# Patient Record
Sex: FEMALE | Race: WHITE | NOT HISPANIC OR LATINO | Employment: OTHER | URBAN - METROPOLITAN AREA
[De-identification: names, ages, dates, MRNs, and addresses within clinical notes are randomized per-mention and may not be internally consistent; named-entity substitution may affect disease eponyms.]

---

## 2022-02-07 ENCOUNTER — TELEPHONE (OUTPATIENT)
Dept: HEMATOLOGY ONCOLOGY | Facility: CLINIC | Age: 72
End: 2022-02-07

## 2022-02-07 NOTE — TELEPHONE ENCOUNTER
Soft Intake Form   Patient Details   Fatoumata Kendall     1950     [id number]     Reason For Appointment   Who is Calling? Patient   If not patient, Name? Who is the Referring Doctor? Dr Dominick Perry    What is the diagnosis? 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 If yes, what is the date it was done? Yes  01/28/22   Biopsy done at [place] [address]? If not, where was it done? no    [facility]    Was imaging done, and was it done at [facility]? If not, where was it done? Yes        Have you been seen by another Oncologist?  If so, who and where (name of facility, city and state) No    For 2nd Opinions Only: Are you currently undergoing treatment, or are you scheduled to start treatment? If yes, name of facility, city and state  n/a   For "History Of" only: Have you completed treatment?  n/a   Have you had Genetic Testing done in the past?  If so, advise to bring test results to their visit no   Record Gathering Information   Did you advise to have records faxed to [phone]? Yes    Did you advise to have disks sent to the proper address with imaging? ("History of" Patients)  5 years of imaging for breast patients-[place]ammos, [place] etc No    Scheduling Information   What is the best call back number?    (If the [facility] is calling, please use their number) [phone]   Miscellaneous Information   I have called Dr Kit Doherty office and have spoken to [facility] from [place]on requesting for patient's records to please be faxed over to [phone]    Patient wanted to be scheduled only with Dr Ann Dye but his appointments were very far out and she does not want to travel any further than Mary mayo made for 03/08

## 2022-02-07 NOTE — TELEPHONE ENCOUNTER
Intake received  Insurance education outreach to follow    02/23/22  Pt has an active [facility] plan that  Has an active date of 01/01/22  No deduct  Out of pocket $4900 met $63 87    03/17/22  Called pt top go over he benefits got her voicemail   Left her a message to call me back    2ND ATTEMPT  05/02/22  Per RTE pt has met $113 87 of the $4900 out of pocket  Called pt to go over the benefits got her voicemail left her a message to call me back

## 2022-02-22 ENCOUNTER — TELEPHONE (OUTPATIENT)
Dept: HEMATOLOGY ONCOLOGY | Facility: CLINIC | Age: 72
End: 2022-02-22

## 2022-02-22 NOTE — TELEPHONE ENCOUNTER
Appointment Confirmation (to confirm pre existing appointments - ONLY)     Appointment with  Dr Tammy Carney   Appointment date & time  2/23/22 at 11:30   Location [facility]   Patient verbilized Understanding  Yes

## 2022-02-23 ENCOUNTER — CONSULT (OUTPATIENT)
Dept: GYNECOLOGIC ONCOLOGY | Facility: CLINIC | Age: 72
End: 2022-02-23
Payer: COMMERCIAL

## 2022-02-23 VITALS
WEIGHT: 214 LBS | HEIGHT: 65 IN | TEMPERATURE: 96.1 F | RESPIRATION RATE: 18 BRPM | DIASTOLIC BLOOD PRESSURE: 60 MMHG | HEART RATE: 79 BPM | BODY MASS INDEX: 35.65 KG/M2 | OXYGEN SATURATION: 97 % | SYSTOLIC BLOOD PRESSURE: 118 MMHG

## 2022-02-23 DIAGNOSIS — C51.9 VULVAR CANCER (HCC): Primary | ICD-10-CM

## 2022-02-23 PROCEDURE — 99245 OFF/OP CONSLTJ NEW/EST HI 55: CPT | Performed by: OBSTETRICS & GYNECOLOGY

## 2022-02-23 RX ORDER — MELOXICAM 15 MG/1
15 TABLET ORAL AS NEEDED
COMMUNITY
Start: 2021-10-15

## 2022-02-23 RX ORDER — HYDROCHLOROTHIAZIDE 12.5 MG/1
1 CAPSULE, GELATIN COATED ORAL DAILY
COMMUNITY
Start: 2022-01-25

## 2022-02-23 RX ORDER — HEPARIN SODIUM 5000 [USP'U]/ML
5000 INJECTION, SOLUTION INTRAVENOUS; SUBCUTANEOUS
Status: CANCELLED | OUTPATIENT
Start: 2022-02-24 | End: 2022-02-25

## 2022-02-23 RX ORDER — LISINOPRIL 40 MG/1
20 TABLET ORAL DAILY
COMMUNITY
Start: 2022-01-25

## 2022-02-23 RX ORDER — ASPIRIN 325 MG
325 TABLET ORAL DAILY
COMMUNITY
End: 2022-03-23 | Stop reason: HOSPADM

## 2022-02-23 RX ORDER — ESTRADIOL 0.1 MG/G
CREAM VAGINAL
COMMUNITY
Start: 2021-11-02

## 2022-02-23 NOTE — H&P (VIEW-ONLY)
Assessment/Plan:    Problem List Items Addressed This Visit        Genitourinary    Vulvar cancer [facility]) - Primary     Patient is very pleasant 51-year-old female with a palpable lesion for several months to a year which has progressed over that time  Recent biopsy indicates 3 sites consistent with squamous cell carcinoma  The clinical exam is consistent with squamous cell carcinoma involving the josé miguel clitoral area all the way down to the 6:00 a m  Region in the perineum  The lesion itself is most consistent with clinical stage II disease of there may be some involvement of the distal vagina  The lesion appears to be resectable  We discussed risks and benefits including bleeding requiring transfusion infection and damage to local structures as well as cosmetic change  Patient understands accepts the risks of surgery have recommended radical vulvectomy with bilateral inguinal femoral lymph node dissection  Consent form was obtained  We discussed the patient will need a Mesa catheter for some time as well as bilateral groin drains  We discussed that an epidural may be helpful postoperatively  Preoperatively a CT of the abdomen and pelvis will be performed to rule out metastatic disease  Chest x-ray will also be performed  Routine preop blood work chest x-ray an EKG will be ordered  Relevant Orders    CT abdomen pelvis w contrast    Case request operating room: [facility] ([facility])    Type and screen    Basic metabolic panel    CBC and differential    EKG 12 lead    XR chest pa & lateral              CHIEF COMPLAINT:  Newly diagnosed squamous cell carcinoma of the vulva        Previous therapy:  Oncology History   Vulvar cancer ([facility] [address] )   2/23/2022 Initial Diagnosis    Vulvar cancer ([facility] [address] )     2/23/2022 -  Cancer Staged    Staging form: Vulva, AJCC 8th Edition  - Clinical stage from 2/23/2022:  FIGO Stage II (cT2, cN0, cM0) - Signed by Nabor Babcock MD on 2/23/2022  Stage prefix: Initial diagnosis             Patient ID: Nhan Diaz is a 70 y o  female  Patient is very pleasant 79-year-old female seen in consultation from Aria Tesfaye md of the [place] [address] regarding evaluation and management of newly diagnosed squamous cell carcinoma of the vulva  Patient noticed onset of vulvar discomfort and "bumps" over the past several months to a year  She was prescribed some Estrace cream by her family physician which she used with minimal improvement  The patient was then referred to gyn and evaluation was performed including 3 vulvar biopsies  Patient was subsequently seen in [place] [address] and underwent vulvar biopsies at 6 3 and 12:00 p m  All consistent with invasive squamous cell carcinoma  Today, the patient is doing well  She denies significant abdominal pain, pelvic pain, nausea, vomiting, constipation, diarrhea, fevers, chills, or vaginal bleeding  She has noted vaginal pain and bumps more so on the left side of the introitus      Review of Systems   Constitutional: Negative  HENT: Negative  Eyes: Negative  Respiratory: Negative  Cardiovascular: Negative  Gastrointestinal: Negative  Endocrine: Negative  Genitourinary: Positive for vaginal pain  Musculoskeletal: Negative  Skin: Negative  Neurological: Negative  Hematological: Negative  Psychiatric/Behavioral: Negative  Current Outpatient Medications   Medication Sig Dispense Refill    ascorbic acid (VITAMIN C) 1000 MG tablet Take 4 tablets by mouth daily      aspirin 325 mg tablet Take 325 mg by mouth daily      estradiol (ESTRACE) 0 1 mg/g vaginal cream Apply 0 5 gm cream vaginally every night at bedtime for two weeks  Then apply 0 5 gm cream vaginally at bedtime on Mondays and Thursdays        hydrochlorothiazide (MICROZIDE) 12 5 mg capsule Take 1 capsule by mouth daily      lisinopril (ZESTRIL) 40 mg tablet Take 1 tablet by mouth daily      meloxicam (MOBIC) 15 mg tablet Take 15 mg by mouth daily      Potassium 99 MG TABS Take 99 mg by mouth daily       No current facility-administered medications for this visit  Allergies   Allergen Reactions    Bee Venom Angioedema    Honey - Food Allergy Other (See Comments)    Propranolol Other (See Comments)    Timolol Other (See Comments)    Amoxicillin Rash     ? - has taken PCN since, with no trouble       Past Medical History:   Diagnosis Date    Hypertension        Past Surgical History:   Procedure Laterality Date    CHOLECYSTECTOMY         OB History        4    Para   3    Term   3       0    AB   1    Living   3       SAB   1    IAB   0    Ectopic   0    Multiple   0    Live Births   3                 Family History   Problem Relation Age of Onset    Cancer Sister        The following portions of the patient's history were reviewed and updated as appropriate: allergies, current medications, past medical history, past social history, past surgical history and problem list       Objective:    Blood pressure 118/60, pulse 79, temperature (!) 96 1 °F (35 6 °C), resp  rate 18, height 5' 5" (1 651 m), weight 97 1 kg (214 lb), SpO2 97 %  Body mass index is 35 61 kg/m²  Physical Exam  Constitutional:       Appearance: She is well-developed  HENT:      Head: Normocephalic and atraumatic  Eyes:      Pupils: Pupils are equal, round, and reactive to light  Cardiovascular:      Rate and Rhythm: Normal rate and regular rhythm  Heart sounds: Normal heart sounds  Pulmonary:      Effort: Pulmonary effort is normal  No respiratory distress  Breath sounds: Normal breath sounds  Chest:   Breasts:      Right: No supraclavicular adenopathy  Left: No supraclavicular adenopathy  Abdominal:      General: Bowel sounds are normal  There is no distension  Palpations: Abdomen is soft  Abdomen is not rigid  Tenderness: There is no abdominal tenderness   There is no guarding or rebound  Genitourinary:         Comments: External female genitalia with lesion approximately 5 cm x 1 cm extending from the introitus all the way up the left side to the clitoral area                   -Normal midline urethral meatus  No lesions notes                  -Bladder without fullness mass or tenderness                  -Vagina without lesion or discharge No significant cystocele or rectocele noted                  -Cervix normal appearing without visible lesions                  -Uterus with normal contour, mobility  No tenderness,                  -Adnexae without  mass or tenderness                  - Anus without fissure of lesion  No inguinal lesions are noted  Musculoskeletal:         General: Normal range of motion  Cervical back: Normal range of motion and neck supple  Lymphadenopathy:      Cervical: No cervical adenopathy  Upper Body:      Right upper body: No supraclavicular adenopathy  Left upper body: No supraclavicular adenopathy  Skin:     General: Skin is warm and dry  Neurological:      Mental Status: She is alert and oriented to person, place, and time     Psychiatric:         Behavior: Behavior normal

## 2022-02-23 NOTE — PROGRESS NOTES
Assessment/Plan:    Problem List Items Addressed This Visit        Genitourinary    Vulvar cancer [facility]) - Primary     Patient is very pleasant 70-year-old female with a palpable lesion for several months to a year which has progressed over that time  Recent biopsy indicates 3 sites consistent with squamous cell carcinoma  The clinical exam is consistent with squamous cell carcinoma involving the josé miguel clitoral area all the way down to the 6:00 a m  Region in the perineum  The lesion itself is most consistent with clinical stage II disease of there may be some involvement of the distal vagina  The lesion appears to be resectable  We discussed risks and benefits including bleeding requiring transfusion infection and damage to local structures as well as cosmetic change  Patient understands accepts the risks of surgery have recommended radical vulvectomy with bilateral inguinal femoral lymph node dissection  Consent form was obtained  We discussed the patient will need a Mesa catheter for some time as well as bilateral groin drains  We discussed that an epidural may be helpful postoperatively  Preoperatively a CT of the abdomen and pelvis will be performed to rule out metastatic disease  Chest x-ray will also be performed  Routine preop blood work chest x-ray an EKG will be ordered  Relevant Orders    CT abdomen pelvis w contrast    Case request operating room: [address] [facility] ([facility])    Type and screen    Basic metabolic panel    CBC and differential    EKG 12 lead    XR chest pa & lateral              CHIEF COMPLAINT:  Newly diagnosed squamous cell carcinoma of the vulva        Previous therapy:  Oncology History   Vulvar cancer ([facility] [address] )   2/23/2022 Initial Diagnosis    Vulvar cancer ([facility] [address] )     2/23/2022 -  Cancer Staged    Staging form: Vulva, AJCC 8th Edition  - Clinical stage from 2/23/2022:  FIGO Stage II (cT2, cN0, cM0) - Signed by Sreedhar Coles MD on 2/23/2022  Stage prefix: Initial diagnosis             Patient ID: Ivett Kaufman is a 70 y o  female  Patient is very pleasant 70-year-old female seen in consultation from Harley Doty md of the [place] [address] regarding evaluation and management of newly diagnosed squamous cell carcinoma of the vulva  Patient noticed onset of vulvar discomfort and "bumps" over the past several months to a year  She was prescribed some Estrace cream by her family physician which she used with minimal improvement  The patient was then referred to gyn and evaluation was performed including 3 vulvar biopsies  Patient was subsequently seen in [place] [address] and underwent vulvar biopsies at 6 3 and 12:00 p m  All consistent with invasive squamous cell carcinoma  Today, the patient is doing well  She denies significant abdominal pain, pelvic pain, nausea, vomiting, constipation, diarrhea, fevers, chills, or vaginal bleeding  She has noted vaginal pain and bumps more so on the left side of the introitus      Review of Systems   Constitutional: Negative  HENT: Negative  Eyes: Negative  Respiratory: Negative  Cardiovascular: Negative  Gastrointestinal: Negative  Endocrine: Negative  Genitourinary: Positive for vaginal pain  Musculoskeletal: Negative  Skin: Negative  Neurological: Negative  Hematological: Negative  Psychiatric/Behavioral: Negative  Current Outpatient Medications   Medication Sig Dispense Refill    ascorbic acid (VITAMIN C) 1000 MG tablet Take 4 tablets by mouth daily      aspirin 325 mg tablet Take 325 mg by mouth daily      estradiol (ESTRACE) 0 1 mg/g vaginal cream Apply 0 5 gm cream vaginally every night at bedtime for two weeks  Then apply 0 5 gm cream vaginally at bedtime on Mondays and Thursdays        hydrochlorothiazide (MICROZIDE) 12 5 mg capsule Take 1 capsule by mouth daily      lisinopril (ZESTRIL) 40 mg tablet Take 1 tablet by mouth daily      meloxicam (MOBIC) 15 mg tablet Take 15 mg by mouth daily      Potassium 99 MG TABS Take 99 mg by mouth daily       No current facility-administered medications for this visit  Allergies   Allergen Reactions    Bee Venom Angioedema    Honey - Food Allergy Other (See Comments)    Propranolol Other (See Comments)    Timolol Other (See Comments)    Amoxicillin Rash     ? - has taken PCN since, with no trouble       Past Medical History:   Diagnosis Date    Hypertension        Past Surgical History:   Procedure Laterality Date    CHOLECYSTECTOMY         OB History        4    Para   3    Term   3       0    AB   1    Living   3       SAB   1    IAB   0    Ectopic   0    Multiple   0    Live Births   3                 Family History   Problem Relation Age of Onset    Cancer Sister        The following portions of the patient's history were reviewed and updated as appropriate: allergies, current medications, past medical history, past social history, past surgical history and problem list       Objective:    Blood pressure 118/60, pulse 79, temperature (!) 96 1 °F (35 6 °C), resp  rate 18, height 5' 5" (1 651 m), weight 97 1 kg (214 lb), SpO2 97 %  Body mass index is 35 61 kg/m²  Physical Exam  Constitutional:       Appearance: She is well-developed  HENT:      Head: Normocephalic and atraumatic  Eyes:      Pupils: Pupils are equal, round, and reactive to light  Cardiovascular:      Rate and Rhythm: Normal rate and regular rhythm  Heart sounds: Normal heart sounds  Pulmonary:      Effort: Pulmonary effort is normal  No respiratory distress  Breath sounds: Normal breath sounds  Chest:   Breasts:      Right: No supraclavicular adenopathy  Left: No supraclavicular adenopathy  Abdominal:      General: Bowel sounds are normal  There is no distension  Palpations: Abdomen is soft  Abdomen is not rigid  Tenderness: There is no abdominal tenderness   There is no guarding or rebound  Genitourinary:         Comments: External female genitalia with lesion approximately 5 cm x 1 cm extending from the introitus all the way up the left side to the clitoral area                   -Normal midline urethral meatus  No lesions notes                  -Bladder without fullness mass or tenderness                  -Vagina without lesion or discharge No significant cystocele or rectocele noted                  -Cervix normal appearing without visible lesions                  -Uterus with normal contour, mobility  No tenderness,                  -Adnexae without  mass or tenderness                  - Anus without fissure of lesion  No inguinal lesions are noted  Musculoskeletal:         General: Normal range of motion  Cervical back: Normal range of motion and neck supple  Lymphadenopathy:      Cervical: No cervical adenopathy  Upper Body:      Right upper body: No supraclavicular adenopathy  Left upper body: No supraclavicular adenopathy  Skin:     General: Skin is warm and dry  Neurological:      Mental Status: She is alert and oriented to person, place, and time     Psychiatric:         Behavior: Behavior normal

## 2022-02-23 NOTE — ASSESSMENT & PLAN NOTE
Patient is very pleasant 70-year-old female with a palpable lesion for several months to a year which has progressed over that time  Recent biopsy indicates 3 sites consistent with squamous cell carcinoma  The clinical exam is consistent with squamous cell carcinoma involving the josé miguel clitoral area all the way down to the 6:00 a m  Region in the perineum  The lesion itself is most consistent with clinical stage II disease of there may be some involvement of the distal vagina  The lesion appears to be resectable  We discussed risks and benefits including bleeding requiring transfusion infection and damage to local structures as well as cosmetic change  Patient understands accepts the risks of surgery have recommended radical vulvectomy with bilateral inguinal femoral lymph node dissection  Consent form was obtained  We discussed the patient will need a Mesa catheter for some time as well as bilateral groin drains  We discussed that an epidural may be helpful postoperatively  Preoperatively a CT of the abdomen and pelvis will be performed to rule out metastatic disease  Chest x-ray will also be performed  Routine preop blood work chest x-ray an EKG will be ordered

## 2022-02-27 ENCOUNTER — TELEPHONE (OUTPATIENT)
Dept: OTHER | Facility: OTHER | Age: 72
End: 2022-02-27

## 2022-02-27 NOTE — TELEPHONE ENCOUNTER
PT  Called in to notify she needs to cancel CT scan on 03/01  I told her to also follow up with central scheduling to cancel actual procedure  Please call her back if any questions

## 2022-02-28 ENCOUNTER — TELEPHONE (OUTPATIENT)
Dept: SURGICAL ONCOLOGY | Facility: CLINIC | Age: 72
End: 2022-02-28

## 2022-02-28 NOTE — TELEPHONE ENCOUNTER
Return call to patient to see if she got an appointment for this closer to home since it's needed for before her surgery  Left message to return my call

## 2022-02-28 NOTE — TELEPHONE ENCOUNTER
Patient called to have CT abdomen pelvis w contrast faxed to [facility] in [place], [place]  The fax number is [phone]  The phone number is [phone]  The patient has upcoming surgery with Dr Sebas Allison on 3/18/22 and will need the CT before surgery

## 2022-03-03 ENCOUNTER — ANESTHESIA EVENT (OUTPATIENT)
Dept: PERIOP | Facility: HOSPITAL | Age: 72
DRG: 746 | End: 2022-03-03
Payer: COMMERCIAL

## 2022-03-16 NOTE — PRE-PROCEDURE INSTRUCTIONS
Pre-Surgery Instructions:   Medication Instructions    ascorbic acid (VITAMIN C) 1000 MG tablet pt instructed to stop prior to surgery     aspirin 325 mg tablet pt instructed to stop prior to surgery     estradiol (ESTRACE) 0 1 mg/g vaginal cream pt instructed to not use on day of surgery     hydrochlorothiazide (MICROZIDE) 12 5 mg capsule pt instructed to not take on day of surgery     lisinopril (ZESTRIL) 40 mg tablet pt insructed to not take on day of surgery     meloxicam (MOBIC) 15 mg tablet pt instructed to stop prior to surgery     Potassium 99 MG TABS pt instructed to stop prior to surgery     Pt denies fever, sob, sore throat and cough  Pt verbalized understanding of shower and med instructions  Pt instructed to stop nsaids and supplements prior to surgery

## 2022-03-17 ENCOUNTER — TELEPHONE (OUTPATIENT)
Dept: GYNECOLOGIC ONCOLOGY | Facility: CLINIC | Age: 72
End: 2022-03-17

## 2022-03-17 NOTE — TELEPHONE ENCOUNTER
Postoperative Eliquis sent to patient's pharmacy  She is aware  Confirmed that she is not taking Mobic or aspirin at this time

## 2022-03-18 ENCOUNTER — ANESTHESIA (OUTPATIENT)
Dept: PERIOP | Facility: HOSPITAL | Age: 72
DRG: 746 | End: 2022-03-18
Payer: COMMERCIAL

## 2022-03-18 ENCOUNTER — HOSPITAL ENCOUNTER (INPATIENT)
Facility: HOSPITAL | Age: 72
LOS: 5 days | Discharge: HOME | DRG: 746 | End: 2022-03-23
Attending: OBSTETRICS & GYNECOLOGY | Admitting: OBSTETRICS & GYNECOLOGY
Payer: COMMERCIAL

## 2022-03-18 DIAGNOSIS — I47.1 SUPRAVENTRICULAR TACHYCARDIA (HCC): ICD-10-CM

## 2022-03-18 DIAGNOSIS — I48.92 ATRIAL FLUTTER (HCC): ICD-10-CM

## 2022-03-18 DIAGNOSIS — C51.9 VULVAR CANCER (HCC): ICD-10-CM

## 2022-03-18 DIAGNOSIS — Z90.79 HISTORY OF VULVECTOMY: Primary | ICD-10-CM

## 2022-03-18 DIAGNOSIS — I26.99 PULMONARY EMBOLISM AND INFARCTION (HCC): ICD-10-CM

## 2022-03-18 DIAGNOSIS — C51.9 SQUAMOUS CELL CARCINOMA OF VULVA (HCC): ICD-10-CM

## 2022-03-18 DIAGNOSIS — I48.91 ATRIAL FIBRILLATION WITH RVR (HCC): ICD-10-CM

## 2022-03-18 LAB
ABO GROUP BLD: NORMAL
BLD GP AB SCN SERPL QL: NEGATIVE
FLUAV RNA RESP QL NAA+PROBE: NEGATIVE
FLUBV RNA RESP QL NAA+PROBE: NEGATIVE
RH BLD: POSITIVE
RSV RNA RESP QL NAA+PROBE: NEGATIVE
SARS-COV-2 RNA RESP QL NAA+PROBE: NEGATIVE
SPECIMEN EXPIRATION DATE: NORMAL

## 2022-03-18 PROCEDURE — 86850 RBC ANTIBODY SCREEN: CPT | Performed by: OBSTETRICS & GYNECOLOGY

## 2022-03-18 PROCEDURE — 51702 INSERT TEMP BLADDER CATH: CPT | Performed by: OBSTETRICS & GYNECOLOGY

## 2022-03-18 PROCEDURE — 0UTM0ZZ RESECTION OF VULVA, OPEN APPROACH: ICD-10-PCS | Performed by: OBSTETRICS & GYNECOLOGY

## 2022-03-18 PROCEDURE — 88344 IMHCHEM/IMCYTCHM EA MLT ANTB: CPT | Performed by: PATHOLOGY

## 2022-03-18 PROCEDURE — 56637 VLVCTMY RAD COMP BI LYMPHAD: CPT | Performed by: OBSTETRICS & GYNECOLOGY

## 2022-03-18 PROCEDURE — 88312 SPECIAL STAINS GROUP 1: CPT | Performed by: PATHOLOGY

## 2022-03-18 PROCEDURE — 99024 POSTOP FOLLOW-UP VISIT: CPT | Performed by: PHYSICIAN ASSISTANT

## 2022-03-18 PROCEDURE — 88342 IMHCHEM/IMCYTCHM 1ST ANTB: CPT | Performed by: PATHOLOGY

## 2022-03-18 PROCEDURE — 86900 BLOOD TYPING SEROLOGIC ABO: CPT | Performed by: OBSTETRICS & GYNECOLOGY

## 2022-03-18 PROCEDURE — 88309 TISSUE EXAM BY PATHOLOGIST: CPT | Performed by: PATHOLOGY

## 2022-03-18 PROCEDURE — 88307 TISSUE EXAM BY PATHOLOGIST: CPT | Performed by: PATHOLOGY

## 2022-03-18 PROCEDURE — 0241U HB NFCT DS VIR RESP RNA 4 TRGT: CPT | Performed by: OBSTETRICS & GYNECOLOGY

## 2022-03-18 PROCEDURE — 88341 IMHCHEM/IMCYTCHM EA ADD ANTB: CPT | Performed by: PATHOLOGY

## 2022-03-18 PROCEDURE — 07BH0ZX EXCISION OF RIGHT INGUINAL LYMPHATIC, OPEN APPROACH, DIAGNOSTIC: ICD-10-PCS | Performed by: OBSTETRICS & GYNECOLOGY

## 2022-03-18 PROCEDURE — 86901 BLOOD TYPING SEROLOGIC RH(D): CPT | Performed by: OBSTETRICS & GYNECOLOGY

## 2022-03-18 PROCEDURE — 07BJ0ZX EXCISION OF LEFT INGUINAL LYMPHATIC, OPEN APPROACH, DIAGNOSTIC: ICD-10-PCS | Performed by: OBSTETRICS & GYNECOLOGY

## 2022-03-18 RX ORDER — ONDANSETRON 2 MG/ML
4 INJECTION INTRAMUSCULAR; INTRAVENOUS EVERY 6 HOURS PRN
Status: DISCONTINUED | OUTPATIENT
Start: 2022-03-18 | End: 2022-03-23 | Stop reason: HOSPADM

## 2022-03-18 RX ORDER — ROCURONIUM BROMIDE 10 MG/ML
INJECTION, SOLUTION INTRAVENOUS AS NEEDED
Status: DISCONTINUED | OUTPATIENT
Start: 2022-03-18 | End: 2022-03-18

## 2022-03-18 RX ORDER — OXYCODONE HYDROCHLORIDE 5 MG/1
TABLET ORAL
Qty: 30 TABLET | Refills: 0 | Status: SHIPPED | OUTPATIENT
Start: 2022-03-18

## 2022-03-18 RX ORDER — CEFAZOLIN SODIUM 2 G/50ML
2000 SOLUTION INTRAVENOUS ONCE
Status: DISCONTINUED | OUTPATIENT
Start: 2022-03-18 | End: 2022-03-18 | Stop reason: HOSPADM

## 2022-03-18 RX ORDER — GLYCOPYRROLATE 0.2 MG/ML
INJECTION INTRAMUSCULAR; INTRAVENOUS AS NEEDED
Status: DISCONTINUED | OUTPATIENT
Start: 2022-03-18 | End: 2022-03-18

## 2022-03-18 RX ORDER — SODIUM CHLORIDE, SODIUM LACTATE, POTASSIUM CHLORIDE, CALCIUM CHLORIDE 600; 310; 30; 20 MG/100ML; MG/100ML; MG/100ML; MG/100ML
INJECTION, SOLUTION INTRAVENOUS CONTINUOUS PRN
Status: DISCONTINUED | OUTPATIENT
Start: 2022-03-18 | End: 2022-03-18

## 2022-03-18 RX ORDER — HYDROMORPHONE HCL/PF 1 MG/ML
0.5 SYRINGE (ML) INJECTION EVERY 4 HOURS PRN
Status: DISCONTINUED | OUTPATIENT
Start: 2022-03-18 | End: 2022-03-23 | Stop reason: HOSPADM

## 2022-03-18 RX ORDER — KETOROLAC TROMETHAMINE 30 MG/ML
INJECTION, SOLUTION INTRAMUSCULAR; INTRAVENOUS AS NEEDED
Status: DISCONTINUED | OUTPATIENT
Start: 2022-03-18 | End: 2022-03-18

## 2022-03-18 RX ORDER — NEOSTIGMINE METHYLSULFATE 1 MG/ML
INJECTION INTRAVENOUS AS NEEDED
Status: DISCONTINUED | OUTPATIENT
Start: 2022-03-18 | End: 2022-03-18

## 2022-03-18 RX ORDER — FENTANYL CITRATE/PF 50 MCG/ML
25 SYRINGE (ML) INJECTION
Status: DISCONTINUED | OUTPATIENT
Start: 2022-03-18 | End: 2022-03-18 | Stop reason: HOSPADM

## 2022-03-18 RX ORDER — ONDANSETRON 2 MG/ML
INJECTION INTRAMUSCULAR; INTRAVENOUS AS NEEDED
Status: DISCONTINUED | OUTPATIENT
Start: 2022-03-18 | End: 2022-03-18

## 2022-03-18 RX ORDER — SODIUM CHLORIDE 9 MG/ML
INJECTION, SOLUTION INTRAVENOUS CONTINUOUS PRN
Status: DISCONTINUED | OUTPATIENT
Start: 2022-03-18 | End: 2022-03-18

## 2022-03-18 RX ORDER — SODIUM CHLORIDE, SODIUM LACTATE, POTASSIUM CHLORIDE, CALCIUM CHLORIDE 600; 310; 30; 20 MG/100ML; MG/100ML; MG/100ML; MG/100ML
125 INJECTION, SOLUTION INTRAVENOUS CONTINUOUS
Status: DISCONTINUED | OUTPATIENT
Start: 2022-03-18 | End: 2022-03-18

## 2022-03-18 RX ORDER — LIDOCAINE HYDROCHLORIDE 10 MG/ML
0.5 INJECTION, SOLUTION EPIDURAL; INFILTRATION; INTRACAUDAL; PERINEURAL ONCE AS NEEDED
Status: COMPLETED | OUTPATIENT
Start: 2022-03-18 | End: 2022-03-18

## 2022-03-18 RX ORDER — PROPOFOL 10 MG/ML
INJECTION, EMULSION INTRAVENOUS AS NEEDED
Status: DISCONTINUED | OUTPATIENT
Start: 2022-03-18 | End: 2022-03-18

## 2022-03-18 RX ORDER — LIDOCAINE HYDROCHLORIDE 10 MG/ML
INJECTION, SOLUTION EPIDURAL; INFILTRATION; INTRACAUDAL; PERINEURAL AS NEEDED
Status: DISCONTINUED | OUTPATIENT
Start: 2022-03-18 | End: 2022-03-18

## 2022-03-18 RX ORDER — HYDROMORPHONE HCL/PF 1 MG/ML
0.5 SYRINGE (ML) INJECTION
Status: DISCONTINUED | OUTPATIENT
Start: 2022-03-18 | End: 2022-03-18 | Stop reason: HOSPADM

## 2022-03-18 RX ORDER — CEFAZOLIN SODIUM 2 G/50ML
SOLUTION INTRAVENOUS AS NEEDED
Status: DISCONTINUED | OUTPATIENT
Start: 2022-03-18 | End: 2022-03-18

## 2022-03-18 RX ORDER — ONDANSETRON 2 MG/ML
4 INJECTION INTRAMUSCULAR; INTRAVENOUS ONCE AS NEEDED
Status: COMPLETED | OUTPATIENT
Start: 2022-03-18 | End: 2022-03-18

## 2022-03-18 RX ORDER — DOCUSATE SODIUM 100 MG/1
100 CAPSULE, LIQUID FILLED ORAL 2 TIMES DAILY
Status: DISCONTINUED | OUTPATIENT
Start: 2022-03-18 | End: 2022-03-23 | Stop reason: HOSPADM

## 2022-03-18 RX ORDER — HEPARIN SODIUM 5000 [USP'U]/ML
5000 INJECTION, SOLUTION INTRAVENOUS; SUBCUTANEOUS
Status: COMPLETED | OUTPATIENT
Start: 2022-03-18 | End: 2022-03-18

## 2022-03-18 RX ORDER — METOCLOPRAMIDE HYDROCHLORIDE 5 MG/ML
10 INJECTION INTRAMUSCULAR; INTRAVENOUS EVERY 6 HOURS PRN
Status: DISCONTINUED | OUTPATIENT
Start: 2022-03-18 | End: 2022-03-20

## 2022-03-18 RX ORDER — ACETAMINOPHEN 325 MG/1
650 TABLET ORAL EVERY 6 HOURS SCHEDULED
Status: DISCONTINUED | OUTPATIENT
Start: 2022-03-18 | End: 2022-03-23 | Stop reason: HOSPADM

## 2022-03-18 RX ORDER — MAGNESIUM HYDROXIDE 1200 MG/15ML
LIQUID ORAL AS NEEDED
Status: DISCONTINUED | OUTPATIENT
Start: 2022-03-18 | End: 2022-03-18 | Stop reason: HOSPADM

## 2022-03-18 RX ORDER — LISINOPRIL 20 MG/1
40 TABLET ORAL DAILY
Status: DISCONTINUED | OUTPATIENT
Start: 2022-03-19 | End: 2022-03-21

## 2022-03-18 RX ORDER — ALBUMIN, HUMAN INJ 5% 5 %
SOLUTION INTRAVENOUS CONTINUOUS PRN
Status: DISCONTINUED | OUTPATIENT
Start: 2022-03-18 | End: 2022-03-18

## 2022-03-18 RX ORDER — PROPOFOL 10 MG/ML
INJECTION, EMULSION INTRAVENOUS CONTINUOUS PRN
Status: DISCONTINUED | OUTPATIENT
Start: 2022-03-18 | End: 2022-03-18

## 2022-03-18 RX ORDER — OXYCODONE HYDROCHLORIDE 5 MG/1
2.5 TABLET ORAL EVERY 4 HOURS PRN
Status: DISCONTINUED | OUTPATIENT
Start: 2022-03-18 | End: 2022-03-23 | Stop reason: HOSPADM

## 2022-03-18 RX ORDER — EPHEDRINE SULFATE 50 MG/ML
INJECTION INTRAVENOUS AS NEEDED
Status: DISCONTINUED | OUTPATIENT
Start: 2022-03-18 | End: 2022-03-18

## 2022-03-18 RX ORDER — DEXAMETHASONE SODIUM PHOSPHATE 10 MG/ML
INJECTION, SOLUTION INTRAMUSCULAR; INTRAVENOUS AS NEEDED
Status: DISCONTINUED | OUTPATIENT
Start: 2022-03-18 | End: 2022-03-18

## 2022-03-18 RX ORDER — HYDROMORPHONE HCL/PF 1 MG/ML
SYRINGE (ML) INJECTION AS NEEDED
Status: DISCONTINUED | OUTPATIENT
Start: 2022-03-18 | End: 2022-03-18

## 2022-03-18 RX ORDER — OXYCODONE HYDROCHLORIDE 5 MG/1
5 TABLET ORAL EVERY 4 HOURS PRN
Status: DISCONTINUED | OUTPATIENT
Start: 2022-03-18 | End: 2022-03-23 | Stop reason: HOSPADM

## 2022-03-18 RX ORDER — FENTANYL CITRATE 50 UG/ML
INJECTION, SOLUTION INTRAMUSCULAR; INTRAVENOUS AS NEEDED
Status: DISCONTINUED | OUTPATIENT
Start: 2022-03-18 | End: 2022-03-18

## 2022-03-18 RX ADMIN — ALBUMIN (HUMAN): 12.5 INJECTION, SOLUTION INTRAVENOUS at 12:52

## 2022-03-18 RX ADMIN — Medication 25 MCG: at 15:49

## 2022-03-18 RX ADMIN — DOCUSATE SODIUM 100 MG: 100 CAPSULE, LIQUID FILLED ORAL at 18:48

## 2022-03-18 RX ADMIN — LIDOCAINE HYDROCHLORIDE 0.2 ML: 10 INJECTION, SOLUTION EPIDURAL; INFILTRATION; INTRACAUDAL; PERINEURAL at 10:24

## 2022-03-18 RX ADMIN — ONDANSETRON 4 MG: 2 INJECTION INTRAMUSCULAR; INTRAVENOUS at 14:17

## 2022-03-18 RX ADMIN — OXYCODONE HYDROCHLORIDE 5 MG: 5 TABLET ORAL at 23:57

## 2022-03-18 RX ADMIN — FENTANYL CITRATE 50 MCG: 50 INJECTION INTRAMUSCULAR; INTRAVENOUS at 11:58

## 2022-03-18 RX ADMIN — ONDANSETRON 4 MG: 2 INJECTION INTRAMUSCULAR; INTRAVENOUS at 15:29

## 2022-03-18 RX ADMIN — PROPOFOL 20 MCG/KG/MIN: 10 INJECTION, EMULSION INTRAVENOUS at 11:27

## 2022-03-18 RX ADMIN — PROPOFOL 150 MG: 10 INJECTION, EMULSION INTRAVENOUS at 11:24

## 2022-03-18 RX ADMIN — DEXAMETHASONE SODIUM PHOSPHATE 10 MG: 10 INJECTION, SOLUTION INTRAMUSCULAR; INTRAVENOUS at 11:25

## 2022-03-18 RX ADMIN — Medication 25 MCG: at 15:54

## 2022-03-18 RX ADMIN — LIDOCAINE HYDROCHLORIDE 20 MG: 10 INJECTION, SOLUTION EPIDURAL; INFILTRATION; INTRACAUDAL; PERINEURAL at 11:24

## 2022-03-18 RX ADMIN — FENTANYL CITRATE 50 MCG: 50 INJECTION INTRAMUSCULAR; INTRAVENOUS at 11:24

## 2022-03-18 RX ADMIN — KETOROLAC TROMETHAMINE 15 MG: 30 INJECTION, SOLUTION INTRAMUSCULAR at 14:19

## 2022-03-18 RX ADMIN — SODIUM CHLORIDE: 0.9 INJECTION, SOLUTION INTRAVENOUS at 11:28

## 2022-03-18 RX ADMIN — Medication 25 MCG: at 15:44

## 2022-03-18 RX ADMIN — CEFAZOLIN SODIUM 2000 MG: 2 SOLUTION INTRAVENOUS at 12:00

## 2022-03-18 RX ADMIN — GLYCOPYRROLATE 0.4 MG: 0.2 INJECTION, SOLUTION INTRAMUSCULAR; INTRAVENOUS at 14:17

## 2022-03-18 RX ADMIN — SODIUM CHLORIDE, SODIUM LACTATE, POTASSIUM CHLORIDE, AND CALCIUM CHLORIDE: .6; .31; .03; .02 INJECTION, SOLUTION INTRAVENOUS at 11:05

## 2022-03-18 RX ADMIN — SODIUM CHLORIDE, SODIUM LACTATE, POTASSIUM CHLORIDE, AND CALCIUM CHLORIDE 125 ML/HR: .6; .31; .03; .02 INJECTION, SOLUTION INTRAVENOUS at 10:24

## 2022-03-18 RX ADMIN — METOCLOPRAMIDE HYDROCHLORIDE 10 MG: 5 INJECTION INTRAMUSCULAR; INTRAVENOUS at 18:49

## 2022-03-18 RX ADMIN — EPHEDRINE SULFATE 5 MG: 50 INJECTION INTRAVENOUS at 13:04

## 2022-03-18 RX ADMIN — ACETAMINOPHEN 325MG 650 MG: 325 TABLET ORAL at 18:48

## 2022-03-18 RX ADMIN — Medication 25 MCG: at 15:30

## 2022-03-18 RX ADMIN — NEOSTIGMINE METHYLSULFATE 4 MG: 1 INJECTION INTRAVENOUS at 14:18

## 2022-03-18 RX ADMIN — ROCURONIUM BROMIDE 50 MG: 50 INJECTION, SOLUTION INTRAVENOUS at 11:25

## 2022-03-18 RX ADMIN — ACETAMINOPHEN 325MG 650 MG: 325 TABLET ORAL at 23:56

## 2022-03-18 RX ADMIN — HYDROMORPHONE HYDROCHLORIDE 0.5 MG: 1 INJECTION, SOLUTION INTRAMUSCULAR; INTRAVENOUS; SUBCUTANEOUS at 12:35

## 2022-03-18 RX ADMIN — HYDROMORPHONE HYDROCHLORIDE 0.5 MG: 1 INJECTION, SOLUTION INTRAMUSCULAR; INTRAVENOUS; SUBCUTANEOUS at 18:48

## 2022-03-18 RX ADMIN — HEPARIN SODIUM 5000 UNITS: 5000 INJECTION INTRAVENOUS; SUBCUTANEOUS at 10:09

## 2022-03-18 RX ADMIN — HYDROMORPHONE HYDROCHLORIDE 0.5 MG: 1 INJECTION, SOLUTION INTRAMUSCULAR; INTRAVENOUS; SUBCUTANEOUS at 11:13

## 2022-03-18 NOTE — DISCHARGE INSTRUCTIONS
[facility]  Clarisa Montes De Oca, and Barb Falk  [phone]    Vulvectomy Discharge Instructions    DISCHARGE INSTRUCTIONS:   Contact your doctor at the number above if:   · You have a fever over 101o  · You have nausea or are vomiting that does not improve after a light meal    · Your pain is getting worse, even after you take medicine  · You feel pain or burning when you urinate, or you have trouble urinating  · You have pus or a foul-smelling odor coming from your vagina  · Your wound is red, swollen, or draining pus  · You see new or an increased amount of bright red blood coming from your vagina or your incisions  · You have questions or concerns about your condition or care  Seek care immediately:   · Your arm or leg feels warm, tender, and painful  It may look swollen and red  · You have increasing abdominal or pelvic pain  · You have heavy vaginal bleeding that fills 1 or more sanitary pads in 1 hour  Call 911 for any of the following:   · You feel lightheaded, short of breath, and have chest pain  · You cough up blood  Medicines: You may need any of the following:  · Prescription medicine may be given  You may receive a prescription for pain medication or be advised to use over the counter (OTC) pain medication such as acetaminophen (Tylenol) or ibuprofen (Advil, Motrin)  Ask your healthcare provider how to take this medicine safely  · NSAIDs , such as ibuprofen, help decrease swelling, pain, and fever  NSAIDs can cause stomach bleeding or kidney problems in certain people  If you take blood thinner medicine, always ask your healthcare provider if NSAIDs are safe for you  Always read the medicine label and follow directions  · Stool softeners help treat or prevent constipation  · Take your medicine as directed  Contact your healthcare provider if you think your medicine is not helping or if you have side effects   Tell him or her if you are allergic to any medicine  Keep a list of the medicines, vitamins, and herbs you take  Include the amounts, and when and why you take them  Bring the list or the pill bottles to follow-up visits  Carry your medicine list with you in case of an emergency  Activity:   · Rest as needed  Get up and move around as directed to help prevent blood clots  Start with short walks and slowly increase the distance every day  Limit the number of times you climb stairs to 2 times each day for the first week  Plan most of your daily activities on one level of your home  · Do not have sex, use tampons, or douche  Do not go into pools or hot tubs until cleared by your doctor  · Ask when you may return to work and to other regular activities  Follow up with your healthcare provider or gynecologist as directed: You may need to return to have stitches removed, and for other tests  Write down your questions so you remember to ask them during your visits  © 2017 [address] [employer] Information is for End User's use only and may not be sold, redistributed or otherwise used for commercial purposes  All illustrations and images included in CareNotes® are the copyrighted property of [employer]  or [employer]  The above information is an  only  It is not intended as medical advice for individual conditions or treatments  Talk to your doctor, nurse or pharmacist before following any medical regimen to see if it is safe and effective for you

## 2022-03-18 NOTE — OP NOTE
OPERATIVE REPORT  PATIENT NAME: Sandra Maradiaga    :  1950  MRN: [id number]  Pt Location: [facility] OR ROOM 06    SURGERY DATE: 3/18/2022    Surgeon(s) and Role:     * Michael King MD - Primary     * Jer Caceres MD - Assisting    Preop Diagnosis:  Vulvar cancer ([facility] [address] ) [C51 9]    Post-Op Diagnosis Codes:     * Vulvar cancer ([facility] [address] ) [C51 9]    Procedure(s) (LRB):  RADICAL COMPLETE VULVECTOMY, BILATERAL INGUINAL FEMORAL LYMPH NODE DISSECTION (N/A)    Specimen(s):  ID Type Source Tests Collected by Time Destination   1 : vulva Tissue Vulva TISSUE EXAM Michael King MD 3/18/2022 1240    2 : left femoral lymp node Tissue Lymph Node TISSUE EXAM Michael King MD 3/18/2022 1329    3 : right femoral lymph node Tissue Lymph Node TISSUE EXAM Michael King MD 3/18/2022 1342        Estimated Blood Loss:   Minimal    Drains:  Closed/Suction Drain Left Groin Bulb (Active)   Number of days: 0       Closed/Suction Drain Right Groin Bulb (Active)   Number of days: 0       Urethral Catheter Non-latex 16 Fr  (Active)   Number of days: 0       Anesthesia Type:   General    Operative Indications:  Vulvar cancer ([facility] [address] ) [C51 9]      Operative Findings:  Exam under anesthesia revealed a 4 x 2 cm periurethral left labial lesion extending to the josé miguel clitoral area  This was completely resected with a radical vulvectomy bilateral inguinal lymph nodes were suspicious for bilateral metastatic disease  The right inguinal area had a single thickened 1 x 1 cm node  The left inguinal area had a 2 x 3 cm node and a 1 x 2 cm node both suspicious for metastatic disease  All suspicious lymph nodes were removed without difficulty  Complications:   None    Procedure and Technique:    After establishment of adequate general anesthesia the patient was placed in dorsal lithotomy position and prepped and draped in the usual sterile fashion  The area of the visible lesions were marked 2 cm lateral to their visible tumor    A circumferential incision from the josé miguel clitoral area to the perineal area was created with a knife  The Bovie electrocautery device was then used to render this hemostatic  In the josé miguel clitoral area the incision was taken down to the fascia  The suspensatory ligaments of the clitoris were taken down with a Monica clamp cut with heavy scissors and suture ligated with 0 Vicryl suture in a Perri fashion  This continued until the level of the urethra  The superior margin of the urethra was opened with Bovie electrocautery device  The para vaginal tissue was taken down with the Bovie electrocautery device  The Monica clamps were then placed from the vaginal introitus to the subcutaneous tissue let the level of the fascia  This was cut with heavy scissors and suture ligated with 0 Vicryl suture in Kellie fashion  This continued down to the perineum  The vaginal incision was completed with the Bovie electrocautery device in the posterior fourchette     The lesion was removed in its entirety  The lesion was then inspected  No close margins were noted  The upper margin of the incision was then closed side to side with horizontal mattress sutures  A Mesa catheter was then placed  Attention was turned to the left groin area were skin incision was created 2 cm distal to the inguinal ligament  Bovie electrocautery device was then used to take down the tissue to the superficial fascia  A pocket was created deep to the superficial fascia in the anterior thigh  Tissue surrounding the inguinal area including the femoral artery vein and into the lymphatic space was then removed with the Bovie electrocautery device  All vessels were cauterized with Bovie electric cautery device  The saphenous vein was not sacrificed  The area was irrigated  A 10 mm flat KILLIAN drain was then placed through a separate stab incision into the inguinal bed  This was affixed to the skin with a 3-0 nylon drain stitch    The subcutaneous superficial fascia was then closed with 2-0 Vicryl suture and the skin was closed with staples  The same procedure was performed on the patient's right-hand side  There was a single enlarged lymph node noted on the patient's right side  There were 2 enlarged very suspicious lymph nodes in the  left inguinal area After removal of all lymph nodes no palpable lymph nodes were present  The patient tolerated procedure without complication sponge instrument counts were correct prior to closure and hemostasis was assured prior to closure     I was present for the entire procedure    Patient Disposition:  PACU       SIGNATURE: Rodríguez Alonso MD  DATE: March 18, 2022  TIME: 2:49 PM

## 2022-03-18 NOTE — ANESTHESIA PREPROCEDURE EVALUATION
Procedure:  RADICAL COMPLETE VULVECTOMY, BILATERAL INGUINAL FEMORAL LYMPH NODE DISSECTION (N/A Vulva)    Relevant Problems   No relevant active problems        Physical Exam    Airway    Mallampati score: III  TM Distance: >3 FB  Neck ROM: full     Dental   No notable dental hx     Cardiovascular  Cardiovascular exam normal    Pulmonary  Pulmonary exam normal     Other Findings        Anesthesia Plan  ASA Score- 2     Anesthesia Type- general with ASA Monitors  Additional Monitors:   Airway Plan: ETT  Plan Factors-Exercise tolerance (METS): >4 METS  Chart reviewed  EKG reviewed  Existing labs reviewed  Patient summary reviewed  Patient is not a current smoker  Induction- intravenous  Postoperative Plan- Plan for postoperative opioid use  Informed Consent- Anesthetic plan and risks discussed with patient  I personally reviewed this patient with the CRNA  Discussed and agreed on the Anesthesia Plan with the CRNA  Dara Soulier

## 2022-03-18 NOTE — ANESTHESIA POSTPROCEDURE EVALUATION
Post-Op Assessment Note    CV Status:  Stable  Pain Score: 0    Pain management: adequate  Multimodal analgesia used between 6 hours prior to anesthesia start to PACU discharge    Mental Status:  Alert   Hydration Status:  Stable   PONV Controlled:  Controlled   Airway Patency:  Patent      Post Op Vitals Reviewed: Yes      Staff: CRNA         No complications documented      /54 (03/18/22 1445)    Temp 97 8 °F (36 6 °C) (03/18/22 1445)    Pulse 62 (03/18/22 1445)   Resp 14 (03/18/22 1445)    SpO2 100 % (03/18/22 1445)

## 2022-03-18 NOTE — PLAN OF CARE
Problem: Potential for Falls  Goal: Patient will remain free of falls  Description: INTERVENTIONS:  - Educate patient/family on patient safety including physical limitations  - Instruct patient to call for assistance with activity   - Consult OT/PT to assist with strengthening/mobility   - Keep Call bell within reach  - Keep bed low and locked with side rails adjusted as appropriate  - Keep care items and personal belongings within reach  - Initiate and maintain comfort rounds  - Make Fall Risk Sign visible to staff  - Offer Toileting every, in advance of need  - Initiate/Maintain  - Obtain necessary fall risk management equipment:   - Apply yellow socks and bracelet for high fall risk patients  - Consider moving patient to room near nurses station  Outcome: Progressing     Problem: MOBILITY - ADULT  Goal: Maintain or return to baseline ADL function  Description: INTERVENTIONS:  -  Assess patient's ability to carry out ADLs; assess patient's baseline for ADL function and identify physical deficits which impact ability to perform ADLs (bathing, care of mouth/teeth, toileting, grooming, dressing, etc )  - Assess/evaluate cause of self-care deficits   - Assess range of motion  - Assess patient's mobility; develop plan if impaired  - Assess patient's need for assistive devices and provide as appropriate  - Encourage maximum independence but intervene and supervise when necessary  - Involve family in performance of ADLs  - Assess for home care needs following discharge   - Consider OT consult to assist with ADL evaluation and planning for discharge  - Provide patient education as appropriate  Outcome: Progressing  Goal: Maintains/Returns to pre admission functional level  Description: INTERVENTIONS:  - Perform BMAT or MOVE assessment daily    - Set and communicate daily mobility goal to care team and patient/family/caregiver     - Collaborate with rehabilitation services on mobility goals if consulted  - Perform Range of Motion   - Reposition patient every   - Dangle patient  - Stand patient   - Ambulate patient   - Out of bed to chair   - Out of bed for meals  - Out of bed for toileting  - Record patient progress and toleration of activity level   Outcome: Progressing

## 2022-03-18 NOTE — INTERVAL H&P NOTE
H&P reviewed  After examining the patient I find no changes in the patients condition since the H&P had been written  Patient presents for radical vulvectomy bilateral inguinal femoral lymphadenectomy  She has no change since being seen a few weeks ago  The patient's CT scan revealed left inguinal femoral lymphadenopathy approximately 3 cm  There is a slightly enlarged greater than 1 cm left pelvic lymph node as well  This will not be addressed at this surgery  We may need to address this later if the inguinal lymph nodes indeed her positive  Plan today is radical vulvectomy with bilateral inguinal femoral lymphadenectomy  Preop testing is stable for surgery      Vitals:    03/18/22 0921   BP: 129/75   Pulse: 84   Resp: 18   Temp: (!) 95 9 °F (35 5 °C)   SpO2: 97%

## 2022-03-18 NOTE — PROGRESS NOTES
TT to Dr Lynne Ayon patient c/o new 8/10 left eye pain, nausea and increased pain  Awaiting orders  Will continue to monitor

## 2022-03-19 LAB
ALBUMIN SERPL BCP-MCNC: 3.4 G/DL (ref 3.5–5)
ALP SERPL-CCNC: 43 U/L (ref 46–116)
ALT SERPL W P-5'-P-CCNC: 19 U/L (ref 12–78)
ANION GAP SERPL CALCULATED.3IONS-SCNC: 9 MMOL/L (ref 4–13)
AST SERPL W P-5'-P-CCNC: 21 U/L (ref 5–45)
BILIRUB SERPL-MCNC: 0.43 MG/DL (ref 0.2–1)
BUN SERPL-MCNC: 23 MG/DL (ref 5–25)
CALCIUM ALBUM COR SERPL-MCNC: 9.4 MG/DL (ref 8.3–10.1)
CALCIUM SERPL-MCNC: 8.9 MG/DL (ref 8.3–10.1)
CHLORIDE SERPL-SCNC: 112 MMOL/L (ref 100–108)
CO2 SERPL-SCNC: 18 MMOL/L (ref 21–32)
CREAT SERPL-MCNC: 0.82 MG/DL (ref 0.6–1.3)
ERYTHROCYTE [DISTWIDTH] IN BLOOD BY AUTOMATED COUNT: 12.6 % (ref 11.6–15.1)
GFR SERPL CREATININE-BSD FRML MDRD: 72 ML/MIN/1.73SQ M
GLUCOSE SERPL-MCNC: 111 MG/DL (ref 65–140)
HCT VFR BLD AUTO: 35.3 % (ref 34.8–46.1)
HGB BLD-MCNC: 11.2 G/DL (ref 11.5–15.4)
MCH RBC QN AUTO: 31.6 PG (ref 26.8–34.3)
MCHC RBC AUTO-ENTMCNC: 31.7 G/DL (ref 31.4–37.4)
MCV RBC AUTO: 100 FL (ref 82–98)
PLATELET # BLD AUTO: 280 THOUSANDS/UL (ref 149–390)
PMV BLD AUTO: 11 FL (ref 8.9–12.7)
POTASSIUM SERPL-SCNC: 4.4 MMOL/L (ref 3.5–5.3)
PROT SERPL-MCNC: 6.8 G/DL (ref 6.4–8.2)
RBC # BLD AUTO: 3.54 MILLION/UL (ref 3.81–5.12)
SODIUM SERPL-SCNC: 139 MMOL/L (ref 136–145)
WBC # BLD AUTO: 9.87 THOUSAND/UL (ref 4.31–10.16)

## 2022-03-19 PROCEDURE — 80053 COMPREHEN METABOLIC PANEL: CPT

## 2022-03-19 PROCEDURE — 99024 POSTOP FOLLOW-UP VISIT: CPT | Performed by: OBSTETRICS & GYNECOLOGY

## 2022-03-19 PROCEDURE — 85027 COMPLETE CBC AUTOMATED: CPT

## 2022-03-19 RX ORDER — SIMETHICONE 80 MG
80 TABLET,CHEWABLE ORAL EVERY 6 HOURS PRN
Status: DISCONTINUED | OUTPATIENT
Start: 2022-03-19 | End: 2022-03-23 | Stop reason: HOSPADM

## 2022-03-19 RX ADMIN — OXYCODONE HYDROCHLORIDE 5 MG: 5 TABLET ORAL at 16:43

## 2022-03-19 RX ADMIN — ENOXAPARIN SODIUM 40 MG: 40 INJECTION SUBCUTANEOUS at 10:21

## 2022-03-19 RX ADMIN — ACETAMINOPHEN 325MG 650 MG: 325 TABLET ORAL at 22:19

## 2022-03-19 RX ADMIN — DOCUSATE SODIUM 100 MG: 100 CAPSULE, LIQUID FILLED ORAL at 17:08

## 2022-03-19 RX ADMIN — DOCUSATE SODIUM 100 MG: 100 CAPSULE, LIQUID FILLED ORAL at 08:08

## 2022-03-19 RX ADMIN — LISINOPRIL 40 MG: 20 TABLET ORAL at 08:08

## 2022-03-19 RX ADMIN — ACETAMINOPHEN 325MG 650 MG: 325 TABLET ORAL at 17:08

## 2022-03-19 RX ADMIN — ACETAMINOPHEN 325MG 650 MG: 325 TABLET ORAL at 05:03

## 2022-03-19 RX ADMIN — ACETAMINOPHEN 325MG 650 MG: 325 TABLET ORAL at 12:45

## 2022-03-19 NOTE — PLAN OF CARE
Problem: Potential for Falls  Goal: Patient will remain free of falls  Description: INTERVENTIONS:  - Educate patient/family on patient safety including physical limitations  - Instruct patient to call for assistance with activity   - Consult OT/PT to assist with strengthening/mobility   - Keep Call bell within reach  - Keep bed low and locked with side rails adjusted as appropriate  - Keep care items and personal belongings within reach  - Initiate and maintain comfort rounds  - Make Fall Risk Sign visible to staff  - Offer Toileting every , in advance of need  - Initiate/Maintain   - Obtain necessary fall risk management equipment:   - Apply yellow socks and bracelet for high fall risk patients  - Consider moving patient to room near nurses station  Outcome: Progressing     Problem: MOBILITY - ADULT  Goal: Maintain or return to baseline ADL function  Description: INTERVENTIONS:  -  Assess patient's ability to carry out ADLs; assess patient's baseline for ADL function and identify physical deficits which impact ability to perform ADLs (bathing, care of mouth/teeth, toileting, grooming, dressing, etc )  - Assess/evaluate cause of self-care deficits   - Assess range of motion  - Assess patient's mobility; develop plan if impaired  - Assess patient's need for assistive devices and provide as appropriate  - Encourage maximum independence but intervene and supervise when necessary  - Involve family in performance of ADLs  - Assess for home care needs following discharge   - Consider OT consult to assist with ADL evaluation and planning for discharge  - Provide patient education as appropriate  Outcome: Progressing  Goal: Maintains/Returns to pre admission functional level  Description: INTERVENTIONS:  - Perform BMAT or MOVE assessment daily    - Set and communicate daily mobility goal to care team and patient/family/caregiver     - Collaborate with rehabilitation services on mobility goals if consulted  - Perform Range of Motion   - Reposition patient every   - Dangle patient   - Stand patient   - Ambulate patient   - Out of bed to chair   - Out of bed for meals   - Out of bed for toileting  - Record patient progress and toleration of activity level   Outcome: Progressing     Problem: Prexisting or High Potential for Compromised Skin Integrity  Goal: Skin integrity is maintained or improved  Description: INTERVENTIONS:  - Identify patients at risk for skin breakdown  - Assess and monitor skin integrity  - Assess and monitor nutrition and hydration status  - Monitor labs   - Assess for incontinence   - Turn and reposition patient  - Assist with mobility/ambulation  - Relieve pressure over bony prominences  - Avoid friction and shearing  - Provide appropriate hygiene as needed including keeping skin clean and dry  - Evaluate need for skin moisturizer/barrier cream  - Collaborate with interdisciplinary team   - Patient/family teaching  - Consider wound care consult   Outcome: Progressing     Problem: PAIN - ADULT  Goal: Verbalizes/displays adequate comfort level or baseline comfort level  Description: Interventions:  - Encourage patient to monitor pain and request assistance  - Assess pain using appropriate pain scale  - Administer analgesics based on type and severity of pain and evaluate response  - Implement non-pharmacological measures as appropriate and evaluate response  - Consider cultural and social influences on pain and pain management  - Notify physician/advanced practitioner if interventions unsuccessful or patient reports new pain  Outcome: Progressing     Problem: INFECTION - ADULT  Goal: Absence or prevention of progression during hospitalization  Description: INTERVENTIONS:  - Assess and monitor for signs and symptoms of infection  - Monitor lab/diagnostic results  - Monitor all insertion sites, i e  indwelling lines, tubes, and drains  - Monitor endotracheal if appropriate and nasal secretions for changes in amount and color  - Orange appropriate cooling/warming therapies per order  - Administer medications as ordered  - Instruct and encourage patient and family to use good hand hygiene technique  - Identify and instruct in appropriate isolation precautions for identified infection/condition  Outcome: Progressing  Goal: Absence of fever/infection during neutropenic period  Description: INTERVENTIONS:  - Monitor WBC    Outcome: Progressing     Problem: SAFETY ADULT  Goal: Patient will remain free of falls  Description: INTERVENTIONS:  - Educate patient/family on patient safety including physical limitations  - Instruct patient to call for assistance with activity   - Consult OT/PT to assist with strengthening/mobility   - Keep Call bell within reach  - Keep bed low and locked with side rails adjusted as appropriate  - Keep care items and personal belongings within reach  - Initiate and maintain comfort rounds  - Make Fall Risk Sign visible to staff  - Offer Toileting every, in advance of need  - Initiate/Maintain   - Obtain necessary fall risk management equipment:  - Apply yellow socks and bracelet for high fall risk patients  - Consider moving patient to room near nurses station  Outcome: Progressing  Goal: Maintain or return to baseline ADL function  Description: INTERVENTIONS:  -  Assess patient's ability to carry out ADLs; assess patient's baseline for ADL function and identify physical deficits which impact ability to perform ADLs (bathing, care of mouth/teeth, toileting, grooming, dressing, etc )  - Assess/evaluate cause of self-care deficits   - Assess range of motion  - Assess patient's mobility; develop plan if impaired  - Assess patient's need for assistive devices and provide as appropriate  - Encourage maximum independence but intervene and supervise when necessary  - Involve family in performance of ADLs  - Assess for home care needs following discharge   - Consider OT consult to assist with ADL evaluation and planning for discharge  - Provide patient education as appropriate  Outcome: Progressing  Goal: Maintains/Returns to pre admission functional level  Description: INTERVENTIONS:  - Perform BMAT or MOVE assessment daily    - Set and communicate daily mobility goal to care team and patient/family/caregiver  - Collaborate with rehabilitation services on mobility goals if consulted  - Perform Range of Motion   - Reposition patient every  - Dangle patient  - Stand patient   - Ambulate patient   - Out of bed to chair    - Out of bed for meals   - Out of bed for toileting  - Record patient progress and toleration of activity level   Outcome: Progressing     Problem: DISCHARGE PLANNING  Goal: Discharge to home or other facility with appropriate resources  Description: INTERVENTIONS:  - Identify barriers to discharge w/patient and caregiver  - Arrange for needed discharge resources and transportation as appropriate  - Identify discharge learning needs (meds, wound care, etc )  - Arrange for interpretive services to assist at discharge as needed  - Refer to Case Management Department for coordinating discharge planning if the patient needs post-hospital services based on physician/advanced practitioner order or complex needs related to functional status, cognitive ability, or social support system  Outcome: Progressing     Problem: Knowledge Deficit  Goal: Patient/family/caregiver demonstrates understanding of disease process, treatment plan, medications, and discharge instructions  Description: Complete learning assessment and assess knowledge base    Interventions:  - Provide teaching at level of understanding  - Provide teaching via preferred learning methods  Outcome: Progressing

## 2022-03-19 NOTE — PROGRESS NOTES
Dr Charles Silva made aware patient c/o bladder fullness and pain around catheter  Bladder scanned for 0ml  Will continue to monitor

## 2022-03-19 NOTE — PROGRESS NOTES
Progress Note - General Surgery   Gurpreet Velez 70 y o  female MRN: [id number]  Unit/Bed#: [facility] 917-01 Encounter: [id number]    Assessment:  Patient has stage 2 vulvar cancer status post radical vulvectomy bilateral inguinal femoral lymph node dissection  Patient is presently postoperative day 1  She is stable  Plan:  Squamous cell carcinoma of the vulva:  Maintain Mesa catheter  Maintain KILLIAN drains  Continue dressing changes daily    Subjective/Objective   Chief Complaint:  Overall feeling blessed  Does have some pain but it is controlled  Subjective:  Patient is comfortable  She has her Mesa in place  She is having good pain control  She is passing gas  She is tolerating regular diet  She tried to sit but found that position uncomfortable  Blood pressure 130/57, pulse 57, temperature 97 7 °F (36 5 °C), resp  rate 17, height 5' 5" (1 651 m), weight 97 1 kg (214 lb), SpO2 99 %  ,Body mass index is 35 61 kg/m²  Intake/Output Summary (Last 24 hours) at 3/19/2022 0923  Last data filed at 3/19/2022 0501  Gross per 24 hour   Intake 2050 ml   Output 740 ml   Net 1310 ml       Invasive Devices  Report    Peripheral Intravenous Line            Peripheral IV Left;Ventral (anterior) Forearm -- days    Peripheral IV 03/18/22 Dorsal (posterior); Right Hand <1 day          Drain            Closed/Suction Drain Left Groin Bulb <1 day    Closed/Suction Drain Right Groin Bulb <1 day    Urethral Catheter Non-latex 16 Fr  <1 day                Physical Exam: /57   Pulse 57   Temp 97 7 °F (36 5 °C)   Resp 17   Ht 5' 5" (1 651 m)   Wt 97 1 kg (214 lb)   SpO2 99%   BMI 35 61 kg/m²     General Appearance:    Alert, cooperative, no distress, appears stated age   Head:    Normocephalic, without obvious abnormality, atraumatic   Eyes:    PERRL, conjunctiva/corneas clear, EOM's intact, fundi     benign, both eyes   Ears:    Normal TM's and external ear canals, both ears   Nose:   Nares normal, septum midline, mucosa normal, no drainage    or sinus tenderness   Throat:   Lips, mucosa, and tongue normal; teeth and gums normal   Neck:   Supple, symmetrical, trachea midline, no adenopathy;     thyroid:  no enlargement/tenderness/nodules; no carotid    bruit or JVD   Back:     Symmetric, no curvature, ROM normal, no CVA tenderness   Lungs:     Clear to auscultation bilaterally, respirations unlabored   Chest Wall:    No tenderness or deformity    Heart:    Regular rate and rhythm, S1 and S2 normal, no murmur, rub   or gallop       Abdomen:     Soft, non-tender, bowel sounds active all four quadrants,     no masses, no organomegaly   Genitalia:    External female genitalia intact status post radical vulvectomy  Some serosanguineous drainage  Bilateral inguinal regions are intact  Bilateral KILLIAN is a are draining serosanguineous fluid         Extremities:   Extremities normal, atraumatic, no cyanosis or edema   Pulses:   2+ and symmetric all extremities   Skin:   Skin color, texture, turgor normal, no rashes or lesions   Lymph nodes:   Cervical, supraclavicular, and axillary nodes normal   Neurologic:   CNII-XII intact, normal strength, sensation and reflexes     throughout       Lab, Imaging and other studies:  CBC: No results found for: WBC, HGB, HCT, MCV, PLT, ADJUSTEDWBC, MCH, MCHC, RDW, MPV, NRBC, CMP:   Lab Results   Component Value Date    SODIUM 139 03/19/2022    K 4 4 03/19/2022     (H) 03/19/2022    CO2 18 (L) 03/19/2022    BUN 23 03/19/2022    CREATININE 0 82 03/19/2022    CALCIUM 8 9 03/19/2022    AST 21 03/19/2022    ALT 19 03/19/2022    ALKPHOS 43 (L) 03/19/2022    EGFR 72 03/19/2022     VTE Pharmacologic Prophylaxis: Enoxaparin (Lovenox)  VTE Mechanical Prophylaxis: sequential compression device

## 2022-03-20 LAB
ALBUMIN SERPL BCP-MCNC: 3.4 G/DL (ref 3.5–5)
ALP SERPL-CCNC: 45 U/L (ref 46–116)
ALT SERPL W P-5'-P-CCNC: 21 U/L (ref 12–78)
ANION GAP SERPL CALCULATED.3IONS-SCNC: 5 MMOL/L (ref 4–13)
AST SERPL W P-5'-P-CCNC: 21 U/L (ref 5–45)
BILIRUB SERPL-MCNC: 0.39 MG/DL (ref 0.2–1)
BUN SERPL-MCNC: 21 MG/DL (ref 5–25)
CALCIUM ALBUM COR SERPL-MCNC: 9.2 MG/DL (ref 8.3–10.1)
CALCIUM SERPL-MCNC: 8.7 MG/DL (ref 8.3–10.1)
CHLORIDE SERPL-SCNC: 111 MMOL/L (ref 100–108)
CO2 SERPL-SCNC: 24 MMOL/L (ref 21–32)
CREAT SERPL-MCNC: 0.86 MG/DL (ref 0.6–1.3)
ERYTHROCYTE [DISTWIDTH] IN BLOOD BY AUTOMATED COUNT: 13.1 % (ref 11.6–15.1)
GFR SERPL CREATININE-BSD FRML MDRD: 68 ML/MIN/1.73SQ M
GLUCOSE SERPL-MCNC: 97 MG/DL (ref 65–140)
HCT VFR BLD AUTO: 33.8 % (ref 34.8–46.1)
HGB BLD-MCNC: 11 G/DL (ref 11.5–15.4)
MCH RBC QN AUTO: 31.6 PG (ref 26.8–34.3)
MCHC RBC AUTO-ENTMCNC: 32.5 G/DL (ref 31.4–37.4)
MCV RBC AUTO: 97 FL (ref 82–98)
PLATELET # BLD AUTO: 293 THOUSANDS/UL (ref 149–390)
PMV BLD AUTO: 10 FL (ref 8.9–12.7)
POTASSIUM SERPL-SCNC: 3.8 MMOL/L (ref 3.5–5.3)
PROT SERPL-MCNC: 6.9 G/DL (ref 6.4–8.2)
RBC # BLD AUTO: 3.48 MILLION/UL (ref 3.81–5.12)
SODIUM SERPL-SCNC: 140 MMOL/L (ref 136–145)
WBC # BLD AUTO: 8.13 THOUSAND/UL (ref 4.31–10.16)

## 2022-03-20 PROCEDURE — 93005 ELECTROCARDIOGRAM TRACING: CPT

## 2022-03-20 PROCEDURE — 85027 COMPLETE CBC AUTOMATED: CPT | Performed by: OBSTETRICS & GYNECOLOGY

## 2022-03-20 PROCEDURE — 99024 POSTOP FOLLOW-UP VISIT: CPT | Performed by: OBSTETRICS & GYNECOLOGY

## 2022-03-20 PROCEDURE — 80053 COMPREHEN METABOLIC PANEL: CPT | Performed by: OBSTETRICS & GYNECOLOGY

## 2022-03-20 RX ORDER — ADENOSINE 3 MG/ML
INJECTION, SOLUTION INTRAVENOUS CODE/TRAUMA/SEDATION MEDICATION
Status: COMPLETED | OUTPATIENT
Start: 2022-03-20 | End: 2022-03-20

## 2022-03-20 RX ORDER — METOCLOPRAMIDE HYDROCHLORIDE 5 MG/ML
10 INJECTION INTRAMUSCULAR; INTRAVENOUS EVERY 6 HOURS PRN
Status: DISCONTINUED | OUTPATIENT
Start: 2022-03-20 | End: 2022-03-23 | Stop reason: HOSPADM

## 2022-03-20 RX ORDER — METOPROLOL TARTRATE 5 MG/5ML
INJECTION INTRAVENOUS CODE/TRAUMA/SEDATION MEDICATION
Status: COMPLETED | OUTPATIENT
Start: 2022-03-20 | End: 2022-03-20

## 2022-03-20 RX ORDER — KETOROLAC TROMETHAMINE 30 MG/ML
15 INJECTION, SOLUTION INTRAMUSCULAR; INTRAVENOUS EVERY 6 HOURS SCHEDULED
Status: DISCONTINUED | OUTPATIENT
Start: 2022-03-20 | End: 2022-03-21

## 2022-03-20 RX ORDER — MAGNESIUM SULFATE 500 MG/ML
VIAL (ML) INJECTION CODE/TRAUMA/SEDATION MEDICATION
Status: COMPLETED | OUTPATIENT
Start: 2022-03-20 | End: 2022-03-20

## 2022-03-20 RX ADMIN — OXYCODONE HYDROCHLORIDE 5 MG: 5 TABLET ORAL at 07:05

## 2022-03-20 RX ADMIN — SODIUM CHLORIDE 500 ML: 0.9 INJECTION, SOLUTION INTRAVENOUS at 23:58

## 2022-03-20 RX ADMIN — METOCLOPRAMIDE HYDROCHLORIDE 10 MG: 5 INJECTION INTRAMUSCULAR; INTRAVENOUS at 18:02

## 2022-03-20 RX ADMIN — ACETAMINOPHEN 325MG 650 MG: 325 TABLET ORAL at 05:06

## 2022-03-20 RX ADMIN — OXYCODONE HYDROCHLORIDE 5 MG: 5 TABLET ORAL at 03:00

## 2022-03-20 RX ADMIN — MAGNESIUM SULFATE HEPTAHYDRATE 2 G: 500 INJECTION, SOLUTION INTRAMUSCULAR; INTRAVENOUS at 23:55

## 2022-03-20 RX ADMIN — KETOROLAC TROMETHAMINE 15 MG: 30 INJECTION, SOLUTION INTRAMUSCULAR at 19:03

## 2022-03-20 RX ADMIN — ACETAMINOPHEN 325MG 650 MG: 325 TABLET ORAL at 23:16

## 2022-03-20 RX ADMIN — KETOROLAC TROMETHAMINE 15 MG: 30 INJECTION, SOLUTION INTRAMUSCULAR at 13:01

## 2022-03-20 RX ADMIN — OXYCODONE HYDROCHLORIDE 5 MG: 5 TABLET ORAL at 19:06

## 2022-03-20 RX ADMIN — LISINOPRIL 40 MG: 20 TABLET ORAL at 09:15

## 2022-03-20 RX ADMIN — HYDROMORPHONE HYDROCHLORIDE 0.5 MG: 1 INJECTION, SOLUTION INTRAMUSCULAR; INTRAVENOUS; SUBCUTANEOUS at 03:22

## 2022-03-20 RX ADMIN — METOROPROLOL TARTRATE 2.5 MG: 5 INJECTION, SOLUTION INTRAVENOUS at 23:59

## 2022-03-20 RX ADMIN — ADENOSINE 6 MG: 3 INJECTION INTRAVENOUS at 23:51

## 2022-03-20 RX ADMIN — SIMETHICONE 80 MG: 80 TABLET, CHEWABLE ORAL at 18:02

## 2022-03-20 RX ADMIN — ACETAMINOPHEN 325MG 650 MG: 325 TABLET ORAL at 13:01

## 2022-03-20 RX ADMIN — ENOXAPARIN SODIUM 40 MG: 40 INJECTION SUBCUTANEOUS at 09:16

## 2022-03-20 RX ADMIN — HYDROMORPHONE HYDROCHLORIDE 0.5 MG: 1 INJECTION, SOLUTION INTRAMUSCULAR; INTRAVENOUS; SUBCUTANEOUS at 09:20

## 2022-03-20 RX ADMIN — ONDANSETRON 4 MG: 2 INJECTION INTRAMUSCULAR; INTRAVENOUS at 16:23

## 2022-03-20 RX ADMIN — DOCUSATE SODIUM 100 MG: 100 CAPSULE, LIQUID FILLED ORAL at 09:15

## 2022-03-20 NOTE — PROGRESS NOTES
Progress Note - General Surgery   Nhan Diaz 70 y o  female MRN: [id number]  Unit/Bed#: [facility] 917-01 Encounter: [id number]    Assessment:  Patient with Zahida Belle history of clinical stage II vulvar cancer now probably stage III based on suspicious nodes  Patient is postop day 2 from radical vulvectomy bilateral inguinal femoral lymphadenectomy  Patient is stable with some pain control issues  Plan:  Squamous cell carcinoma the vulva  Maintain Mesa catheter  Maintain KILLIAN drains  Continue daily dressing changes add Toradol 15 mg IV q 6  Subjective/Objective   Chief Complaint:  I am having a lot of pain    Subjective:  Patient is having a lot of pain and difficulty getting out of bed due to pain  She is reluctant to take the oxycodone she is passing gas and tolerating a regular diet without difficulty  Blood pressure 131/59, pulse 74, temperature 98 2 °F (36 8 °C), temperature source Oral, resp  rate 18, height 5' 5" (1 651 m), weight 97 1 kg (214 lb), SpO2 95 %  ,Body mass index is 35 61 kg/m²  Intake/Output Summary (Last 24 hours) at 3/20/2022 1022  Last data filed at 3/20/2022 0901  Gross per 24 hour   Intake 900 ml   Output 1320 ml   Net -420 ml       Invasive Devices  Report    Peripheral Intravenous Line            Peripheral IV Left;Ventral (anterior) Forearm -- days    Peripheral IV 03/20/22 Distal;Dorsal (posterior); Left Forearm <1 day          Drain            Closed/Suction Drain Left Groin Bulb 1 day    Closed/Suction Drain Right Groin Bulb 1 day    Urethral Catheter Non-latex 16 Fr  1 day                Physical Exam: /59 (BP Location: Right arm)   Pulse 74   Temp 98 2 °F (36 8 °C) (Oral)   Resp 18   Ht 5' 5" (1 651 m)   Wt 97 1 kg (214 lb)   SpO2 95%   BMI 35 61 kg/m²     General Appearance:    Alert, cooperative, no distress, appears stated age   Head:    Normocephalic, without obvious abnormality, atraumatic   Eyes:    PERRL, conjunctiva/corneas clear, EOM's intact, fundi     benign, both eyes   Ears:    Normal TM's and external ear canals, both ears   Nose:   Nares normal, septum midline, mucosa normal, no drainage    or sinus tenderness   Throat:   Lips, mucosa, and tongue normal; teeth and gums normal   Neck:   Supple, symmetrical, trachea midline, no adenopathy;     thyroid:  no enlargement/tenderness/nodules; no carotid    bruit or JVD   Back:     Symmetric, no curvature, ROM normal, no CVA tenderness   Lungs:     Clear to auscultation bilaterally, respirations unlabored   Chest Wall:    No tenderness or deformity    Heart:    Regular rate and rhythm, S1 and S2 normal, no murmur, rub   or gallop       Abdomen:     Soft, non-tender, bowel sounds active all four quadrants,     no masses, no organomegaly   Genitalia:    External female genitalia status post vulvectomy  Minimal serosanguineous drainage is noted  Incisions are intact  Groin incisions are unremarkable  KILLIAN bulb drains draining effectively    For serous fluid, discharge or tenderness       Extremities:   Extremities normal, atraumatic, no cyanosis or edema   Pulses:   2+ and symmetric all extremities   Skin:   Skin color, texture, turgor normal, no rashes or lesions   Lymph nodes:   Cervical, supraclavicular, and axillary nodes normal   Neurologic:   CNII-XII intact, normal strength, sensation and reflexes     throughout       Lab, Imaging and other studies:  CBC:   Lab Results   Component Value Date    WBC 8 13 03/20/2022    HGB 11 0 (L) 03/20/2022    HCT 33 8 (L) 03/20/2022    MCV 97 03/20/2022     03/20/2022    MCH 31 6 03/20/2022    MCHC 32 5 03/20/2022    RDW 13 1 03/20/2022    MPV 10 0 03/20/2022   , CMP:   Lab Results   Component Value Date    SODIUM 140 03/20/2022    K 3 8 03/20/2022     (H) 03/20/2022    CO2 24 03/20/2022    BUN 21 03/20/2022    CREATININE 0 86 03/20/2022    CALCIUM 8 7 03/20/2022    AST 21 03/20/2022    ALT 21 03/20/2022    ALKPHOS 45 (L) 03/20/2022    EGFR 68 03/20/2022 VTE Pharmacologic Prophylaxis: Enoxaparin (Lovenox)  VTE Mechanical Prophylaxis: sequential compression device

## 2022-03-20 NOTE — PLAN OF CARE
Problem: Potential for Falls  Goal: Patient will remain free of falls  Description: INTERVENTIONS:  - Educate patient/family on patient safety including physical limitations  - Instruct patient to call for assistance with activity   - Consult OT/PT to assist with strengthening/mobility   - Keep Call bell within reach  - Keep bed low and locked with side rails adjusted as appropriate  - Keep care items and personal belongings within reach  - Initiate and maintain comfort rounds  - Make Fall Risk Sign visible to staff  - Offer Toileting every in advance of need  - Initiate/Maintain   - Obtain necessary fall risk management equipment:   - Apply yellow socks and bracelet for high fall risk patients  - Consider moving patient to room near nurses station  Outcome: Progressing     Problem: MOBILITY - ADULT  Goal: Maintain or return to baseline ADL function  Description: INTERVENTIONS:  -  Assess patient's ability to carry out ADLs; assess patient's baseline for ADL function and identify physical deficits which impact ability to perform ADLs (bathing, care of mouth/teeth, toileting, grooming, dressing, etc )  - Assess/evaluate cause of self-care deficits   - Assess range of motion  - Assess patient's mobility; develop plan if impaired  - Assess patient's need for assistive devices and provide as appropriate  - Encourage maximum independence but intervene and supervise when necessary  - Involve family in performance of ADLs  - Assess for home care needs following discharge   - Consider OT consult to assist with ADL evaluation and planning for discharge  - Provide patient education as appropriate  Outcome: Progressing  Goal: Maintains/Returns to pre admission functional level  Description: INTERVENTIONS:  - Perform BMAT or MOVE assessment daily    - Set and communicate daily mobility goal to care team and patient/family/caregiver     - Collaborate with rehabilitation services on mobility goals if consulted  - Perform Range of Motion  - Reposition patient every  - Dangle patient   - Stand patient   - Ambulate patient  - Out of bed to chair   - Out of bed for meals   - Out of bed for toileting  - Record patient progress and toleration of activity level   Outcome: Progressing     Problem: Prexisting or High Potential for Compromised Skin Integrity  Goal: Skin integrity is maintained or improved  Description: INTERVENTIONS:  - Identify patients at risk for skin breakdown  - Assess and monitor skin integrity  - Assess and monitor nutrition and hydration status  - Monitor labs   - Assess for incontinence   - Turn and reposition patient  - Assist with mobility/ambulation  - Relieve pressure over bony prominences  - Avoid friction and shearing  - Provide appropriate hygiene as needed including keeping skin clean and dry  - Evaluate need for skin moisturizer/barrier cream  - Collaborate with interdisciplinary team   - Patient/family teaching  - Consider wound care consult   Outcome: Progressing     Problem: PAIN - ADULT  Goal: Verbalizes/displays adequate comfort level or baseline comfort level  Description: Interventions:  - Encourage patient to monitor pain and request assistance  - Assess pain using appropriate pain scale  - Administer analgesics based on type and severity of pain and evaluate response  - Implement non-pharmacological measures as appropriate and evaluate response  - Consider cultural and social influences on pain and pain management  - Notify physician/advanced practitioner if interventions unsuccessful or patient reports new pain  Outcome: Progressing     Problem: INFECTION - ADULT  Goal: Absence or prevention of progression during hospitalization  Description: INTERVENTIONS:  - Assess and monitor for signs and symptoms of infection  - Monitor lab/diagnostic results  - Monitor all insertion sites, i e  indwelling lines, tubes, and drains  - Monitor endotracheal if appropriate and nasal secretions for changes in amount and color  - [place] appropriate cooling/warming therapies per order  - Administer medications as ordered  - Instruct and encourage patient and family to use good hand hygiene technique  - Identify and instruct in appropriate isolation precautions for identified infection/condition  Outcome: Progressing  Goal: Absence of fever/infection during neutropenic period  Description: INTERVENTIONS:  - Monitor WBC    Outcome: Progressing     Problem: SAFETY ADULT  Goal: Patient will remain free of falls  Description: INTERVENTIONS:  - Educate patient/family on patient safety including physical limitations  - Instruct patient to call for assistance with activity   - Consult OT/PT to assist with strengthening/mobility   - Keep Call bell within reach  - Keep bed low and locked with side rails adjusted as appropriate  - Keep care items and personal belongings within reach  - Initiate and maintain comfort rounds  - Make Fall Risk Sign visible to staff  - Offer Toileting every in advance of need  - Initiate/Maintain  - Obtain necessary fall risk management equipment:   - Apply yellow socks and bracelet for high fall risk patients  - Consider moving patient to room near nurses station  Outcome: Progressing  Goal: Maintain or return to baseline ADL function  Description: INTERVENTIONS:  -  Assess patient's ability to carry out ADLs; assess patient's baseline for ADL function and identify physical deficits which impact ability to perform ADLs (bathing, care of mouth/teeth, toileting, grooming, dressing, etc )  - Assess/evaluate cause of self-care deficits   - Assess range of motion  - Assess patient's mobility; develop plan if impaired  - Assess patient's need for assistive devices and provide as appropriate  - Encourage maximum independence but intervene and supervise when necessary  - Involve family in performance of ADLs  - Assess for home care needs following discharge   - Consider OT consult to assist with ADL evaluation and planning for discharge  - Provide patient education as appropriate  Outcome: Progressing  Goal: Maintains/Returns to pre admission functional level  Description: INTERVENTIONS:  - Perform BMAT or MOVE assessment daily    - Set and communicate daily mobility goal to care team and patient/family/caregiver  - Collaborate with rehabilitation services on mobility goals if consulted  - Perform Range of Motion   - Reposition patient every   - Dangle patient  - Stand patient  - Ambulate patient   - Out of bed to chair  - Out of bed for meals  - Out of bed for toileting  - Record patient progress and toleration of activity level   Outcome: Progressing     Problem: DISCHARGE PLANNING  Goal: Discharge to home or other facility with appropriate resources  Description: INTERVENTIONS:  - Identify barriers to discharge w/patient and caregiver  - Arrange for needed discharge resources and transportation as appropriate  - Identify discharge learning needs (meds, wound care, etc )  - Arrange for interpretive services to assist at discharge as needed  - Refer to Case Management Department for coordinating discharge planning if the patient needs post-hospital services based on physician/advanced practitioner order or complex needs related to functional status, cognitive ability, or social support system  Outcome: Progressing     Problem: Knowledge Deficit  Goal: Patient/family/caregiver demonstrates understanding of disease process, treatment plan, medications, and discharge instructions  Description: Complete learning assessment and assess knowledge base    Interventions:  - Provide teaching at level of understanding  - Provide teaching via preferred learning methods  Outcome: Progressing

## 2022-03-20 NOTE — UTILIZATION REVIEW
Initial Clinical Review    Elective  IP surgical procedure  Age/Sex: 70 y o  female  Surgery Date:  3/18  Procedure:  RADICAL COMPLETE VULVECTOMY, BILATERAL INGUINAL FEMORAL LYMPH NODE DISSECTION   Anesthesia:  General   Operative Findings:     Exam under anesthesia revealed a 4 x 2 cm periurethral left labial lesion extending to the josé miguel clitoral area  This was completely resected with a radical vulvectomy bilateral inguinal lymph nodes were suspicious for bilateral metastatic disease  The right inguinal area had a single thickened 1 x 1 cm node  The left inguinal area had a 2 x 3 cm node and a 1 x 2 cm node both suspicious for metastatic disease  All suspicious lymph nodes were removed without difficulty  POD#1 Progress Note:  Stable postop course  Maintain Mesa catheter  Maintain KILLIAN drains  Continue dressing changes daily  Drains are functioning, blood-tinged drainage (110 cc yesterday,  70 cc today)         Admission Orders: Date/Time/Statement:   Admission Orders (From admission, onward)     Ordered        03/18/22 1637  Inpatient Admission  Once                      Orders Placed This Encounter   Procedures    Inpatient Admission     Standing Status:   Standing     Number of Occurrences:   1     Order Specific Question:   Level of Care     Answer:   Med Surg [16]     Order Specific Question:   Estimated length of stay     Answer:   More than 2 Midnights     Order Specific Question:   Certification     Answer:   I certify that inpatient services are medically necessary for this patient for a duration of greater than two midnights  See H&P and MD Progress Notes for additional information about the patient's course of treatment       Vital Signs: BP (!) 108/40   Pulse 75   Temp 98 3 °F (36 8 °C)   Resp 18   Ht 5' 5" (1 651 m)   Wt 97 1 kg (214 lb)   SpO2 96%   BMI 35 61 kg/m²     Pertinent Labs/Diagnostic Test Results:   No orders to display     Results from last 7 days   Lab Units 03/18/22  0924   SARS-COV-2  Negative     Results from last 7 days   Lab Units 03/19/22  0502   WBC Thousand/uL 9 87   HEMOGLOBIN g/dL 11 2*   HEMATOCRIT % 35 3   PLATELETS Thousands/uL 280         Results from last 7 days   Lab Units 03/19/22  0502   SODIUM mmol/L 139   POTASSIUM mmol/L 4 4   CHLORIDE mmol/L 112*   CO2 mmol/L 18*   ANION GAP mmol/L 9   BUN mg/dL 23   CREATININE mg/dL 0 82   EGFR ml/min/1 73sq m 72   CALCIUM mg/dL 8 9     Results from last 7 days   Lab Units 03/19/22  0502   AST U/L 21   ALT U/L 19   ALK PHOS U/L 43*   TOTAL PROTEIN g/dL 6 8   ALBUMIN g/dL 3 4*   TOTAL BILIRUBIN mg/dL 0 43         Results from last 7 days   Lab Units 03/19/22  0502   GLUCOSE RANDOM mg/dL 111     Results from last 7 days   Lab Units 03/18/22  0924   INFLUENZA A PCR  Negative   INFLUENZA B PCR  Negative   RSV PCR  Negative     Diet: reg   Mobility: amb qid   DVT Prophylaxis:  scd's     Medications/Pain Control:   Scheduled Medications:  acetaminophen, 650 mg, Oral, Q6H LLUVIA  docusate sodium, 100 mg, Oral, BID  enoxaparin, 40 mg, Subcutaneous, Q24H LLUVIA  lisinopril, 40 mg, Oral, Daily      Continuous IV Infusions:     PRN Meds:  HYDROmorphone, 0 5 mg, Intravenous, Q4H PRN  metoclopramide, 10 mg, Intravenous, Q6H PRN  ondansetron, 4 mg, Intravenous, Q6H PRN   3/18 postop x1   oxyCODONE, 2 5 mg, Oral, Q4H PRN   Or  oxyCODONE, 5 mg, Oral, Q4H PRN   Post op 3/18 x1 and 2/19 x1   simethicone, 80 mg, Oral, Q6H PRN        Network Utilization Review Department  ATTENTION: Please call with any questions or concerns to [phone] and carefully listen to the prompts so that you are directed to the right person  All voicemails are confidential   Dafne Carson all requests for admission clinical reviews, approved or denied determinations and any other requests to dedicated fax number below belonging to the campus where the patient is receiving treatment   List of dedicated fax numbers for the Facilities:  [facility] ADMISSION DENIALS (Administrative/Medical Necessity) [phone]   [address] (Maternity/NICU/Pediatrics) [address] [place] [address],[address] Floor [facility] 40 125 [facility]  [phone]   Elan Barnhart 50 [address] [facility] [place] [address] [place] [address] [id number] [address] [facility] [address] [phone]

## 2022-03-21 ENCOUNTER — APPOINTMENT (INPATIENT)
Dept: RADIOLOGY | Facility: HOSPITAL | Age: 72
DRG: 746 | End: 2022-03-21
Payer: COMMERCIAL

## 2022-03-21 ENCOUNTER — APPOINTMENT (INPATIENT)
Dept: NON INVASIVE DIAGNOSTICS | Facility: HOSPITAL | Age: 72
DRG: 746 | End: 2022-03-21
Payer: COMMERCIAL

## 2022-03-21 PROBLEM — N17.9 AKI (ACUTE KIDNEY INJURY) (HCC): Status: ACTIVE | Noted: 2022-03-21

## 2022-03-21 PROBLEM — I10 PRIMARY HYPERTENSION: Status: ACTIVE | Noted: 2022-03-21

## 2022-03-21 PROBLEM — I47.1 SVT (SUPRAVENTRICULAR TACHYCARDIA) (HCC): Status: ACTIVE | Noted: 2022-03-21

## 2022-03-21 PROBLEM — I95.9 HYPOTENSION: Status: ACTIVE | Noted: 2022-03-21

## 2022-03-21 PROBLEM — I48.91 ATRIAL FIBRILLATION WITH RVR (HCC): Status: ACTIVE | Noted: 2022-03-21

## 2022-03-21 LAB
ALBUMIN SERPL BCP-MCNC: 3.5 G/DL (ref 3.5–5)
ALP SERPL-CCNC: 50 U/L (ref 46–116)
ALT SERPL W P-5'-P-CCNC: 26 U/L (ref 12–78)
ANION GAP SERPL CALCULATED.3IONS-SCNC: 5 MMOL/L (ref 4–13)
ANION GAP SERPL CALCULATED.3IONS-SCNC: 7 MMOL/L (ref 4–13)
AORTIC ROOT: 3.1 CM
APICAL FOUR CHAMBER EJECTION FRACTION: 63 %
ASCENDING AORTA: 3 CM (ref 2.1–3.15)
AST SERPL W P-5'-P-CCNC: 26 U/L (ref 5–45)
ATRIAL RATE: 0 BPM
ATRIAL RATE: 153 BPM
ATRIAL RATE: 156 BPM
ATRIAL RATE: 170 BPM
BACTERIA UR QL AUTO: ABNORMAL /HPF
BASOPHILS # BLD AUTO: 0.03 THOUSANDS/ΜL (ref 0–0.1)
BASOPHILS NFR BLD AUTO: 0 % (ref 0–1)
BILIRUB SERPL-MCNC: 0.56 MG/DL (ref 0.2–1)
BILIRUB UR QL STRIP: NEGATIVE
BUN SERPL-MCNC: 28 MG/DL (ref 5–25)
BUN SERPL-MCNC: 31 MG/DL (ref 5–25)
CALCIUM SERPL-MCNC: 8.7 MG/DL (ref 8.3–10.1)
CALCIUM SERPL-MCNC: 8.7 MG/DL (ref 8.3–10.1)
CARDIAC TROPONIN I PNL SERPL HS: 3 NG/L (ref 8–18)
CHLORIDE SERPL-SCNC: 111 MMOL/L (ref 100–108)
CHLORIDE SERPL-SCNC: 113 MMOL/L (ref 100–108)
CLARITY UR: ABNORMAL
CO2 SERPL-SCNC: 21 MMOL/L (ref 21–32)
CO2 SERPL-SCNC: 23 MMOL/L (ref 21–32)
COLOR UR: ABNORMAL
CORTIS AM PEAK SERPL-MCNC: 11.2 UG/DL (ref 4.2–22.4)
CREAT SERPL-MCNC: 1.34 MG/DL (ref 0.6–1.3)
CREAT SERPL-MCNC: 1.5 MG/DL (ref 0.6–1.3)
D DIMER PPP FEU-MCNC: 11.29 UG/ML FEU
E WAVE DECELERATION TIME: 222 MS
EOSINOPHIL # BLD AUTO: 0.02 THOUSAND/ΜL (ref 0–0.61)
EOSINOPHIL NFR BLD AUTO: 0 % (ref 0–6)
ERYTHROCYTE [DISTWIDTH] IN BLOOD BY AUTOMATED COUNT: 13 % (ref 11.6–15.1)
ERYTHROCYTE [DISTWIDTH] IN BLOOD BY AUTOMATED COUNT: 13.1 % (ref 11.6–15.1)
FRACTIONAL SHORTENING: 27 % (ref 28–44)
GFR SERPL CREATININE-BSD FRML MDRD: 34 ML/MIN/1.73SQ M
GFR SERPL CREATININE-BSD FRML MDRD: 39 ML/MIN/1.73SQ M
GLUCOSE SERPL-MCNC: 142 MG/DL (ref 65–140)
GLUCOSE SERPL-MCNC: 150 MG/DL (ref 65–140)
GLUCOSE UR STRIP-MCNC: NEGATIVE MG/DL
HCT VFR BLD AUTO: 35.1 % (ref 34.8–46.1)
HCT VFR BLD AUTO: 35.5 % (ref 34.8–46.1)
HGB BLD-MCNC: 11.1 G/DL (ref 11.5–15.4)
HGB BLD-MCNC: 11.2 G/DL (ref 11.5–15.4)
HGB UR QL STRIP.AUTO: ABNORMAL
HYALINE CASTS #/AREA URNS LPF: ABNORMAL /LPF
IMM GRANULOCYTES # BLD AUTO: 0.03 THOUSAND/UL (ref 0–0.2)
IMM GRANULOCYTES NFR BLD AUTO: 0 % (ref 0–2)
INTERVENTRICULAR SEPTUM IN DIASTOLE (PARASTERNAL SHORT AXIS VIEW): 0.9 CM
INTERVENTRICULAR SEPTUM: 0.9 CM (ref 0.55–1.03)
KETONES UR STRIP-MCNC: NEGATIVE MG/DL
LA/AORTA RATIO 2D: 1.16
LAAS-AP4: 15.1 CM2
LACTATE SERPL-SCNC: 1.2 MMOL/L (ref 0.5–2)
LEFT ATRIUM SIZE: 3.6 CM
LEFT INTERNAL DIMENSION IN SYSTOLE: 3.3 CM (ref 3.46–5.23)
LEFT VENTRICULAR INTERNAL DIMENSION IN DIASTOLE: 4.5 CM (ref 5.72–8.52)
LEFT VENTRICULAR POSTERIOR WALL IN END DIASTOLE: 0.9 CM (ref 0.53–1.01)
LEFT VENTRICULAR STROKE VOLUME: 48 ML
LEUKOCYTE ESTERASE UR QL STRIP: ABNORMAL
LVSV (TEICH): 48 ML
LYMPHOCYTES # BLD AUTO: 0.74 THOUSANDS/ΜL (ref 0.6–4.47)
LYMPHOCYTES NFR BLD AUTO: 7 % (ref 14–44)
MAGNESIUM SERPL-MCNC: 3.7 MG/DL (ref 1.6–2.6)
MCH RBC QN AUTO: 31 PG (ref 26.8–34.3)
MCH RBC QN AUTO: 31.3 PG (ref 26.8–34.3)
MCHC RBC AUTO-ENTMCNC: 31.5 G/DL (ref 31.4–37.4)
MCHC RBC AUTO-ENTMCNC: 31.6 G/DL (ref 31.4–37.4)
MCV RBC AUTO: 98 FL (ref 82–98)
MCV RBC AUTO: 99 FL (ref 82–98)
MONOCYTES # BLD AUTO: 1.02 THOUSAND/ΜL (ref 0.17–1.22)
MONOCYTES NFR BLD AUTO: 9 % (ref 4–12)
MUCOUS THREADS UR QL AUTO: ABNORMAL
MV E'TISSUE VEL-SEP: 8 CM/S
MV PEAK A VEL: 1.11 M/S
MV PEAK E VEL: 100 CM/S
MV STENOSIS PRESSURE HALF TIME: 65 MS
MV VALVE AREA P 1/2 METHOD: 3.38 CM2
NEUTROPHILS # BLD AUTO: 9.27 THOUSANDS/ΜL (ref 1.85–7.62)
NEUTS SEG NFR BLD AUTO: 84 % (ref 43–75)
NITRITE UR QL STRIP: NEGATIVE
NON-SQ EPI CELLS URNS QL MICRO: ABNORMAL /HPF
NRBC BLD AUTO-RTO: 0 /100 WBCS
PA SYSTOLIC PRESSURE: 31 MMHG
PH UR STRIP.AUTO: 5.5 [PH]
PLATELET # BLD AUTO: 285 THOUSANDS/UL (ref 149–390)
PLATELET # BLD AUTO: 287 THOUSANDS/UL (ref 149–390)
PMV BLD AUTO: 10 FL (ref 8.9–12.7)
PMV BLD AUTO: 9.9 FL (ref 8.9–12.7)
POTASSIUM SERPL-SCNC: 3.9 MMOL/L (ref 3.5–5.3)
POTASSIUM SERPL-SCNC: 4 MMOL/L (ref 3.5–5.3)
PROT SERPL-MCNC: 6.6 G/DL (ref 6.4–8.2)
PROT UR STRIP-MCNC: ABNORMAL MG/DL
QRS AXIS: -19 DEGREES
QRS AXIS: -29 DEGREES
QRS AXIS: -76 DEGREES
QRS AXIS: 0 DEGREES
QRSD INTERVAL: 0 MS
QRSD INTERVAL: 104 MS
QRSD INTERVAL: 88 MS
QRSD INTERVAL: 92 MS
QT INTERVAL: 0 MS
QT INTERVAL: 270 MS
QT INTERVAL: 302 MS
QT INTERVAL: 306 MS
QTC INTERVAL: 0 MS
QTC INTERVAL: 432 MS
QTC INTERVAL: 435 MS
QTC INTERVAL: 482 MS
RBC # BLD AUTO: 3.55 MILLION/UL (ref 3.81–5.12)
RBC # BLD AUTO: 3.61 MILLION/UL (ref 3.81–5.12)
RBC #/AREA URNS AUTO: ABNORMAL /HPF
RIGHT VENTRICLE ID DIMENSION: 3.5 CM
SL CV LV EF: 60
SL CV PED ECHO LEFT VENTRICLE DIASTOLIC VOLUME (MOD BIPLANE) 2D: 92 ML
SL CV PED ECHO LEFT VENTRICLE SYSTOLIC VOLUME (MOD BIPLANE) 2D: 44 ML
SODIUM SERPL-SCNC: 139 MMOL/L (ref 136–145)
SODIUM SERPL-SCNC: 141 MMOL/L (ref 136–145)
SP GR UR STRIP.AUTO: 1.02 (ref 1–1.03)
T WAVE AXIS: 0 DEGREES
T WAVE AXIS: 50 DEGREES
T WAVE AXIS: 62 DEGREES
T WAVE AXIS: 82 DEGREES
TR MAX PG: 26 MMHG
TR PEAK VELOCITY: 2.6 M/S
TRICUSPID ANNULAR PLANE SYSTOLIC EXCURSION: 2.6 CM
TRICUSPID VALVE PEAK REGURGITATION VELOCITY: 2.56 M/S
TSH SERPL DL<=0.05 MIU/L-ACNC: 0.74 UIU/ML (ref 0.36–3.74)
UROBILINOGEN UR STRIP-ACNC: 2 MG/DL
VENTRICULAR RATE: 0 BPM
VENTRICULAR RATE: 120 BPM
VENTRICULAR RATE: 153 BPM
VENTRICULAR RATE: 156 BPM
WBC # BLD AUTO: 11.11 THOUSAND/UL (ref 4.31–10.16)
WBC # BLD AUTO: 9.61 THOUSAND/UL (ref 4.31–10.16)
WBC #/AREA URNS AUTO: ABNORMAL /HPF
Z-SCORE OF ASCENDING AORTA: 1.42
Z-SCORE OF INTERVENTRICULAR SEPTUM IN END DIASTOLE: 0.92
Z-SCORE OF LEFT VENTRICULAR DIMENSION IN END DIASTOLE: -4.39
Z-SCORE OF LEFT VENTRICULAR DIMENSION IN END SYSTOLE: -2
Z-SCORE OF LEFT VENTRICULAR POSTERIOR WALL IN END DIASTOLE: 1.03

## 2022-03-21 PROCEDURE — 71045 X-RAY EXAM CHEST 1 VIEW: CPT

## 2022-03-21 PROCEDURE — 93970 EXTREMITY STUDY: CPT | Performed by: SURGERY

## 2022-03-21 PROCEDURE — 93306 TTE W/DOPPLER COMPLETE: CPT | Performed by: INTERNAL MEDICINE

## 2022-03-21 PROCEDURE — 99222 1ST HOSP IP/OBS MODERATE 55: CPT | Performed by: INTERNAL MEDICINE

## 2022-03-21 PROCEDURE — 93970 EXTREMITY STUDY: CPT

## 2022-03-21 PROCEDURE — 78580 LUNG PERFUSION IMAGING: CPT

## 2022-03-21 PROCEDURE — 93005 ELECTROCARDIOGRAM TRACING: CPT

## 2022-03-21 PROCEDURE — 93010 ELECTROCARDIOGRAM REPORT: CPT | Performed by: INTERNAL MEDICINE

## 2022-03-21 PROCEDURE — NC001 PR NO CHARGE: Performed by: OBSTETRICS & GYNECOLOGY

## 2022-03-21 PROCEDURE — 93306 TTE W/DOPPLER COMPLETE: CPT

## 2022-03-21 PROCEDURE — 85025 COMPLETE CBC W/AUTO DIFF WBC: CPT | Performed by: PHYSICIAN ASSISTANT

## 2022-03-21 PROCEDURE — 83605 ASSAY OF LACTIC ACID: CPT | Performed by: PHYSICIAN ASSISTANT

## 2022-03-21 PROCEDURE — 85027 COMPLETE CBC AUTOMATED: CPT

## 2022-03-21 PROCEDURE — 85379 FIBRIN DEGRADATION QUANT: CPT | Performed by: PHYSICIAN ASSISTANT

## 2022-03-21 PROCEDURE — 81001 URINALYSIS AUTO W/SCOPE: CPT | Performed by: PHYSICIAN ASSISTANT

## 2022-03-21 PROCEDURE — 99222 1ST HOSP IP/OBS MODERATE 55: CPT | Performed by: PHYSICIAN ASSISTANT

## 2022-03-21 PROCEDURE — 80053 COMPREHEN METABOLIC PANEL: CPT

## 2022-03-21 PROCEDURE — 83735 ASSAY OF MAGNESIUM: CPT | Performed by: PHYSICIAN ASSISTANT

## 2022-03-21 PROCEDURE — 80048 BASIC METABOLIC PNL TOTAL CA: CPT | Performed by: PHYSICIAN ASSISTANT

## 2022-03-21 PROCEDURE — 99024 POSTOP FOLLOW-UP VISIT: CPT | Performed by: PHYSICIAN ASSISTANT

## 2022-03-21 PROCEDURE — NC001 PR NO CHARGE: Performed by: PHYSICIAN ASSISTANT

## 2022-03-21 PROCEDURE — 82533 TOTAL CORTISOL: CPT | Performed by: PHYSICIAN ASSISTANT

## 2022-03-21 PROCEDURE — A9540 TC99M MAA: HCPCS

## 2022-03-21 PROCEDURE — 84484 ASSAY OF TROPONIN QUANT: CPT | Performed by: PHYSICIAN ASSISTANT

## 2022-03-21 PROCEDURE — 84443 ASSAY THYROID STIM HORMONE: CPT | Performed by: PHYSICIAN ASSISTANT

## 2022-03-21 RX ORDER — METOPROLOL TARTRATE 5 MG/5ML
2.5 INJECTION INTRAVENOUS ONCE
Status: DISCONTINUED | OUTPATIENT
Start: 2022-03-21 | End: 2022-03-21

## 2022-03-21 RX ORDER — METOPROLOL TARTRATE 5 MG/5ML
5 INJECTION INTRAVENOUS EVERY 6 HOURS PRN
Status: DISCONTINUED | OUTPATIENT
Start: 2022-03-21 | End: 2022-03-23 | Stop reason: HOSPADM

## 2022-03-21 RX ORDER — METOPROLOL TARTRATE 5 MG/5ML
2.5 INJECTION INTRAVENOUS EVERY 6 HOURS PRN
Status: DISCONTINUED | OUTPATIENT
Start: 2022-03-21 | End: 2022-03-21

## 2022-03-21 RX ORDER — ALBUMIN, HUMAN INJ 5% 5 %
25 SOLUTION INTRAVENOUS ONCE
Status: COMPLETED | OUTPATIENT
Start: 2022-03-21 | End: 2022-03-21

## 2022-03-21 RX ORDER — LISINOPRIL 10 MG/1
10 TABLET ORAL DAILY
Status: DISCONTINUED | OUTPATIENT
Start: 2022-03-21 | End: 2022-03-21

## 2022-03-21 RX ORDER — METOPROLOL TARTRATE 5 MG/5ML
5 INJECTION INTRAVENOUS ONCE
Status: COMPLETED | OUTPATIENT
Start: 2022-03-21 | End: 2022-03-21

## 2022-03-21 RX ORDER — HEPARIN SODIUM 5000 [USP'U]/ML
5000 INJECTION, SOLUTION INTRAVENOUS; SUBCUTANEOUS EVERY 8 HOURS SCHEDULED
Status: DISCONTINUED | OUTPATIENT
Start: 2022-03-21 | End: 2022-03-22

## 2022-03-21 RX ORDER — SODIUM CHLORIDE, SODIUM GLUCONATE, SODIUM ACETATE, POTASSIUM CHLORIDE, MAGNESIUM CHLORIDE, SODIUM PHOSPHATE, DIBASIC, AND POTASSIUM PHOSPHATE .53; .5; .37; .037; .03; .012; .00082 G/100ML; G/100ML; G/100ML; G/100ML; G/100ML; G/100ML; G/100ML
500 INJECTION, SOLUTION INTRAVENOUS ONCE
Status: COMPLETED | OUTPATIENT
Start: 2022-03-21 | End: 2022-03-21

## 2022-03-21 RX ADMIN — KETOROLAC TROMETHAMINE 15 MG: 30 INJECTION, SOLUTION INTRAMUSCULAR at 00:26

## 2022-03-21 RX ADMIN — ACETAMINOPHEN 325MG 650 MG: 325 TABLET ORAL at 13:03

## 2022-03-21 RX ADMIN — ALBUMIN (HUMAN) 25 G: 12.5 INJECTION, SOLUTION INTRAVENOUS at 01:10

## 2022-03-21 RX ADMIN — HEPARIN SODIUM 5000 UNITS: 5000 INJECTION INTRAVENOUS; SUBCUTANEOUS at 22:41

## 2022-03-21 RX ADMIN — HEPARIN SODIUM 5000 UNITS: 5000 INJECTION INTRAVENOUS; SUBCUTANEOUS at 16:02

## 2022-03-21 RX ADMIN — ACETAMINOPHEN 325MG 650 MG: 325 TABLET ORAL at 05:23

## 2022-03-21 RX ADMIN — OXYCODONE HYDROCHLORIDE 5 MG: 5 TABLET ORAL at 22:40

## 2022-03-21 RX ADMIN — OXYCODONE HYDROCHLORIDE 5 MG: 5 TABLET ORAL at 08:37

## 2022-03-21 RX ADMIN — ACETAMINOPHEN 325MG 650 MG: 325 TABLET ORAL at 23:00

## 2022-03-21 RX ADMIN — HEPARIN SODIUM 5000 UNITS: 5000 INJECTION INTRAVENOUS; SUBCUTANEOUS at 05:23

## 2022-03-21 RX ADMIN — SODIUM CHLORIDE 1000 ML: 0.9 INJECTION, SOLUTION INTRAVENOUS at 04:26

## 2022-03-21 RX ADMIN — SODIUM CHLORIDE, SODIUM GLUCONATE, SODIUM ACETATE, POTASSIUM CHLORIDE, MAGNESIUM CHLORIDE, SODIUM PHOSPHATE, DIBASIC, AND POTASSIUM PHOSPHATE 500 ML: .53; .5; .37; .037; .03; .012; .00082 INJECTION, SOLUTION INTRAVENOUS at 06:22

## 2022-03-21 RX ADMIN — SODIUM CHLORIDE, SODIUM LACTATE, POTASSIUM CHLORIDE, AND CALCIUM CHLORIDE 1000 ML: .6; .31; .03; .02 INJECTION, SOLUTION INTRAVENOUS at 16:03

## 2022-03-21 RX ADMIN — ACETAMINOPHEN 325MG 650 MG: 325 TABLET ORAL at 18:17

## 2022-03-21 RX ADMIN — OXYCODONE HYDROCHLORIDE 5 MG: 5 TABLET ORAL at 13:08

## 2022-03-21 RX ADMIN — METOPROLOL TARTRATE 5 MG: 1 INJECTION, SOLUTION INTRAVENOUS at 13:27

## 2022-03-21 NOTE — ASSESSMENT & PLAN NOTE
· RRT early morning of 3/21 for  bpm, s/p adenosine revealing Afib  bpm   · Converted to NSR after 2 5 mg IV metoprolol  Remained asymptomatic  · Check 2D echo, consult cardiology  · D dimer elevated, however in setting of malignancy and now with new onset NADIYA  Check VQ scan to r/o PE  Check b/l LE duplex given patient c/o b/l calf pain     · Start metoprolol 25 mg bid with modified hold parameters given NADIYA  · Monitor on telemetry  · 2 5 mg IV lopressor prn

## 2022-03-21 NOTE — ASSESSMENT & PLAN NOTE
71 yo POD#4 from radical vulvectomy & b/l inguinal femoral LND for 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, likely now stage III based on suspicious lymph nodes noted intra-op      · Pain: Tylenol mahesh, jen & dilaudid prn  · TLD: s/p Mesa catheter, b/l KILLIAN drains to remain in place, peripheral IV  · FEN: regular diet  · GI: Colace BID  · DVT ppx: SCDs, Eliquis 5 mg BID

## 2022-03-21 NOTE — PROGRESS NOTES
Pastoral Care Progress Note    3/21/2022  Patient: Silvino Hi : 1950  Admission Date & Time: 3/18/2022 0855  MRN: [id number] [facility]: [id number]       responded to RRT  Patient with medical staff no family present at this time   provided silent supportive presence  Spiritual Care will remain available          22 0000   Clinical Encounter Type   Visited With Patient not available   Crisis Visit   (RRT)

## 2022-03-21 NOTE — ASSESSMENT & PLAN NOTE
Cr elevated to 1 5 on 3/21/22 from baseline of 0 8  In setting of hypotension, GI losses from multiple episodes of diarrhea 3/20/22, & N/V with poor PO intake post op  S/p IV hydration 3/21/22  Toradol discontinued    · Improving  · UA small leuk and negative nitrites  · Voiding w/ adequate UOP since Mesa removal  · Cr 1 50 --> 0 69  · Trend Cr on daily CMP

## 2022-03-21 NOTE — UTILIZATION REVIEW
Inpatient Admission Authorization Request   NOTIFICATION OF INPATIENT ADMISSION/INPATIENT AUTHORIZATION REQUEST   SERVICING FACILITY:   [facility]  Address: [address] [place] [address], [address] [place] [address]  Tax ID: [id number]  NPI: [id number]  Place of Service: Inpatient [address] [facility] Code: 24     ATTENDING PROVIDER:  Attending Name and NPI#: Karen Street Md [[id number]]  Address: [address] [place] [address], [address] [place] [address]  Phone: [phone]     UTILIZATION REVIEW CONTACT:  Hansa De Santiago Utilization   Network Utilization Review Department  Phone: [phone]  Fax: [phone]  Email: [email or web address]     PHYSICIAN ADVISORY SERVICES:  FOR [id number] REVIEW - MEDICAL NECESSITY DENIAL  Phone: [phone]  Fax: [phone]  Email: [email or web address]     TYPE OF REQUEST:  Inpatient Status     ADMISSION INFORMATION:  ADMISSION DATE/TIME: 3/18/22  4:37 PM  PATIENT DIAGNOSIS CODE/DESCRIPTION:  Vulvar cancer ([facility] [address] ) [C51 9]  DISCHARGE DATE/TIME: No discharge date for patient encounter  IMPORTANT INFORMATION:  Please contact the Hansa De Santiago directly with any questions or concerns regarding this request  Department voicemails are confidential     Send requests for admission clinical reviews, concurrent reviews, approvals, and administrative denials due to lack of clinical to fax [phone]  Initial Clinical Review    Elective  IP surgical procedure  Age/Sex: 70 y o  female  Surgery Date:  3/18  Procedure:  RADICAL COMPLETE VULVECTOMY, BILATERAL INGUINAL FEMORAL LYMPH NODE DISSECTION   Anesthesia:  General   Operative Findings:     Exam under anesthesia revealed a 4 x 2 cm periurethral left labial lesion extending to the josé miguel clitoral area  This was completely resected with a radical vulvectomy bilateral inguinal lymph nodes were suspicious for bilateral metastatic disease    The right inguinal area had a single thickened 1 x 1 cm node  The left inguinal area had a 2 x 3 cm node and a 1 x 2 cm node both suspicious for metastatic disease  All suspicious lymph nodes were removed without difficulty  POD#1 Progress Note:  Stable postop course  Maintain Mesa catheter  Maintain KILLIAN drains  Continue dressing changes daily  Drains are functioning, blood-tinged drainage (110 cc yesterday,  70 cc today)         Admission Orders: Date/Time/Statement:   Admission Orders (From admission, onward)     Ordered        03/18/22 1637  Inpatient Admission  Once                      Orders Placed This Encounter   Procedures    Inpatient Admission     Standing Status:   Standing     Number of Occurrences:   1     Order Specific Question:   Level of Care     Answer:   Med Surg [16]     Order Specific Question:   Estimated length of stay     Answer:   More than 2 Midnights     Order Specific Question:   Certification     Answer:   I certify that inpatient services are medically necessary for this patient for a duration of greater than two midnights  See H&P and MD Progress Notes for additional information about the patient's course of treatment       Vital Signs: /78 (BP Location: Left arm)   Pulse 77   Temp 97 9 °F (36 6 °C)   Resp 18   Ht 5' 5" (1 651 m)   Wt 97 1 kg (214 lb)   SpO2 98%   BMI 35 61 kg/m²     Pertinent Labs/Diagnostic Test Results:   VAS lower limb venous duplex study, complete bilateral    (Results Pending)   NM inpatient order    (Results Pending)     Results from last 7 days   Lab Units 03/18/22  0924   SARS-COV-2  Negative     Results from last 7 days   Lab Units 03/21/22  0048 03/20/22  0320 03/19/22  0502   WBC Thousand/uL 11 11* 8 13 9 87   HEMOGLOBIN g/dL 11 2* 11 0* 11 2*   HEMATOCRIT % 35 5 33 8* 35 3   PLATELETS Thousands/uL 287 293 280   NEUTROS ABS Thousands/µL 9 27*  --   --          Results from last 7 days   Lab Units 03/21/22  0048 03/20/22  0320 03/19/22  0502   SODIUM mmol/L 139 140 139   POTASSIUM mmol/L 4 0 3 8 4 4   CHLORIDE mmol/L 111* 111* 112*   CO2 mmol/L 21 24 18*   ANION GAP mmol/L 7 5 9   BUN mg/dL 31* 21 23   CREATININE mg/dL 1 50* 0 86 0 82   EGFR ml/min/1 73sq m 34 68 72   CALCIUM mg/dL 8 7 8 7 8 9   MAGNESIUM mg/dL 3 7*  --   --      Results from last 7 days   Lab Units 03/20/22  0320 03/19/22  0502   AST U/L 21 21   ALT U/L 21 19   ALK PHOS U/L 45* 43*   TOTAL PROTEIN g/dL 6 9 6 8   ALBUMIN g/dL 3 4* 3 4*   TOTAL BILIRUBIN mg/dL 0 39 0 43         Results from last 7 days   Lab Units 03/21/22  0048 03/20/22  0320 03/19/22  0502   GLUCOSE RANDOM mg/dL 150* 97 111     Results from last 7 days   Lab Units 03/18/22  0924   INFLUENZA A PCR  Negative   INFLUENZA B PCR  Negative   RSV PCR  Negative     Diet: reg   Mobility: amb qid   DVT Prophylaxis:  scd's     Medications/Pain Control:   Scheduled Medications:  acetaminophen, 650 mg, Oral, Q6H LLUVIA  docusate sodium, 100 mg, Oral, BID  heparin (porcine), 5,000 Units, Subcutaneous, Q8H [address]      Continuous IV Infusions:     PRN Meds:  HYDROmorphone, 0 5 mg, Intravenous, Q4H PRN  metoclopramide, 10 mg, Intravenous, Q6H PRN  ondansetron, 4 mg, Intravenous, Q6H PRN   3/18 postop x1   oxyCODONE, 2 5 mg, Oral, Q4H PRN   Or  oxyCODONE, 5 mg, Oral, Q4H PRN   Post op 3/18 x1 and 2/19 x1   simethicone, 80 mg, Oral, Q6H PRN        Network Utilization Review Department  ATTENTION: Please call with any questions or concerns to [phone] and carefully listen to the prompts so that you are directed to the right person  All voicemails are confidential   Kaleb Bunch all requests for admission clinical reviews, approved or denied determinations and any other requests to dedicated fax number below belonging to the campus where the patient is receiving treatment   List of dedicated fax numbers for the Facilities:  [address] DENIALS (Administrative/Medical Necessity) [phone]   [address]  (Maternity/NICU/Pediatrics) [phone]   [address]  [address]  [facility] 40 125 [facility]  [phone]   [address] [place] [address] [address] [place] [address] Kelvin [address] [place] [address] [phone]

## 2022-03-21 NOTE — PROGRESS NOTES
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, likely now stage III based on suspicious lymph nodes noted intra-op  NADIYA (acute kidney injury) ([facility] )  Assessment & Plan  Cr newly elevated to 1 5 with baseline of 0 8  In setting of hypotension, GI losses from multiple episodes of diarrhea 3/20/22, & N/V with poor PO intake post op  S/p 1L IVF at RR + 500 cc albumin running  Toradol discontinued  · Check UA, consider renal US  · Monitor UOP  · Trend Cr on daily CMP      Primary hypertension  Assessment & Plan  Home meds include Lisinopril and HCTZ  · Hold lisinopril given NADIYA with plan to re-start as able  · Metoprolol 25 mg BID initiated in setting of new Afib with RVR  · Monitor BPs    Atrial fibrillation with RVR (HCC)  Assessment & Plan  Rapid response 3/21/22 early am for SVT, s/p adenosine revealing Afib  bpm   Converted to NSR after metoprolol 2 5 mg IV  Patient remained asymptomatic throughout  D-dimer elevated in s/o malignancy and new onset NADIYA  S/p SLIM consult, appreciate recs  · Consult cardiology  · 2D echo ordered, f/u result  · VQ scan to r/o PE ordered, f/u result  · L/l LE duplex given patient c/o b/l calf pain ordered, f/u result  · Monitor on telemetry  · Metoprolol 25 mg BID with modified hold parameters given NADIYA  · Metoprolol 2 5 mg IV prn    * Squamous cell carcinoma of vulva ([facility] [address] )  Assessment & Plan  71 yo POD#3 from radical vulvectomy & b/l inguinal femoral LND for 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, likely now stage III based on suspicious lymph nodes noted intra-op      - Pain: Tylenol mahesh, jen & dilaudid prn  - TLD: Mesa catheter, b/l KILLIAN drains, peripheral IV  - FEN: regular diet  - GI: Colace BID  - DVT ppx: SCDs, heparin           Subjective:    Farhan Messina has no current complaints  Pain is well controlled with scheduled Tylenol and prn jen & dilaudid  Patient currently has Mesa catheter in place with only 200 mL UOP since 1 pm yesterday  She is ambulating  Patient is currently passing flatus and has had 5-6 episodes of diarrhea overnight  She is tolerating PO, and denies nausea or vomitting  Patient denies fever, chills, chest pain, shortness of breath, lightheadedness, dizziness, fatigue, abdomianl pain  Upon asking, she notes ongoing pain in the vulvar area and the site of Mesa insertion  Objective:  /78 (BP Location: Left arm)   Pulse 77   Temp 97 9 °F (36 6 °C)   Resp 18   Ht 5' 5" (1 651 m)   Wt 97 1 kg (214 lb)   SpO2 98%   BMI 35 61 kg/m²     I/O last 3 completed shifts: In: 740 [P O :240; IV Piggyback:500]  Out: 7060 [Urine:1325; Emesis/NG output:200; Drains:255]  No intake/output data recorded  Lab Results   Component Value Date    WBC 11 11 (H) 03/21/2022    HGB 11 2 (L) 03/21/2022    HCT 35 5 03/21/2022    MCV 98 03/21/2022     03/21/2022       Lab Results   Component Value Date    CALCIUM 8 7 03/21/2022    K 4 0 03/21/2022    CO2 21 03/21/2022     (H) 03/21/2022    BUN 31 (H) 03/21/2022    CREATININE 1 50 (H) 03/21/2022           Physical Exam  Constitutional:       General: She is not in acute distress  Appearance: Normal appearance  She is not ill-appearing  HENT:      Mouth/Throat:      Mouth: Mucous membranes are moist    Eyes:      Extraocular Movements: Extraocular movements intact  Cardiovascular:      Rate and Rhythm: Normal rate and regular rhythm  Heart sounds: Normal heart sounds  Pulmonary:      Effort: Pulmonary effort is normal       Breath sounds: Normal breath sounds  Abdominal:      General: There is no distension  Palpations: Abdomen is soft  Tenderness: There is no abdominal tenderness  Musculoskeletal:         General: Tenderness (moderate bilateral calf tenderness, no thigh tenderness) present  No swelling  Right lower leg: No edema  Left lower leg: No edema  Skin:     General: Skin is warm and dry  Capillary Refill: Capillary refill takes less than 2 seconds  Coloration: Skin is not jaundiced or pale  Neurological:      General: No focal deficit present  Mental Status: She is alert  Psychiatric:         Mood and Affect: Mood normal          Behavior: Behavior normal          Thought Content:  Thought content normal          Judgment: Judgment normal            Estela Owens MD  3/21/2022  7:33 AM

## 2022-03-21 NOTE — PROGRESS NOTES
Progress Note - Gyn-Oncology  Pinedale Beets 70 y o  female MRN: [id number]  Unit/Bed#: [facility] 917-01 Encounter: [id number]    Assessment:  79-year-old s/p radical vulvectomy and b/l inguinal LND for clinical stage II vulvar cancer, POD#3  New onsent atrial fibrillation with RVR, s/p rapid response overnight, and NADIYA with AM creatinine 1 5 (baseline 0 8), now improved to 1 34 s/p 1L IVF  Plan:  1  Clinical stage II vulvar cancer, suspicion for stage III based on operative findings  Continue local wound care  Monitor KILLIAN drain output  2  NADIYA, creatinine 1 5 this AM  Improved s/p 1L IVF  Urine output continue to be low, will re-bolus  Monitor labs  3  Atrial firbrillation with RVR, cardiology consult, ongoing work-up  Will plan for anticoagulation  Initiated low-dose metoprolol by SLIM  Chief Complaint: "I am feeling better"    Subjective: 79-year-old POD#3 from radical vulvectomy and n/l inguinal LND  She is currently without acute complaints  She had an eventful evening with rapid response called, with a  Fib/RVR  She notes "my chest feels better"  She denies CP/SOB  Her pain is well controlled from a surgical standpoint  She notes diarrhea  Mesa in-place  Objective:    Blood pressure 143/75, pulse 88, temperature 98 4 °F (36 9 °C), temperature source Oral, resp  rate 18, height 5' 5" (1 651 m), weight 97 1 kg (214 lb), SpO2 95 %  ,Body mass index is 35 61 kg/m²  Intake/Output Summary (Last 24 hours) at 3/21/2022 1446  Last data filed at 3/21/2022 0901  Gross per 24 hour   Intake 500 ml   Output 690 ml   Net -190 ml       Invasive Devices  Report    Peripheral Intravenous Line            Peripheral IV 03/21/22 Dorsal (posterior); Right Wrist <1 day          Drain            Closed/Suction Drain Left Groin Bulb 3 days    Closed/Suction Drain Right Groin Bulb 3 days    Urethral Catheter Non-latex 16 Fr  3 days                Physical Exam:     Constitutional:       General: She is not in acute distress  Appearance: Normal appearance  She is not ill-appearing  Pulmonary:      Effort: Pulmonary effort is normal     Abdominal:      General: There is no distension  Palpations: Abdomen is soft  Tenderness: There is no abdominal tenderness  Genitourinary: External female genitalia edematous, with surgical changes  Incisions intact  Mesa catheter present  Musculoskeletal:         Right lower leg: No edema  Left lower leg: No edema  Skin:     General: Skin is warm and dry  Coloration: Skin is not jaundiced or pale  B/L inguinal KILLIAN drains  Serosanguinous drainage present  B/L groin incisions intact, clean, dry  Staples present  Neurological:      General: No focal deficit present  Mental Status: She is alert  Psychiatric:         Mood and Affect: Mood normal          Behavior: Behavior normal          Thought Content: Thought content normal          Judgment: Judgment normal         Lab, Imaging and other studies:I have personally reviewed pertinent lab results      VTE Pharmacologic Prophylaxis: Heparin

## 2022-03-21 NOTE — ASSESSMENT & PLAN NOTE
· Likely stage III per gyn/onc  · POD #3 radical vulvectomy bilateral inguinal femoral lymphadenectomy   · Some issues with pain mgmt per notes  Pain regimen ordered    · Care per primary team

## 2022-03-21 NOTE — ASSESSMENT & PLAN NOTE
· Maintained on lisinopril 40 mg qd  · Hold lisinopril given NADIYA, restart as able  · Start metoprolol 25 mg bid in setting of new Afib with RVR  · Trend BPs

## 2022-03-21 NOTE — ASSESSMENT & PLAN NOTE
Rapid response 3/21/22 early am for SVT, s/p adenosine revealing Afib  bpm   Converted to NSR after metoprolol 2 5 mg IV  Patient remained asymptomatic throughout  D-dimer elevated in s/o malignancy and new onset NADIYA    · S/p SLIM and cardiology consults, appreciate recs  · Metoprolol 25 mg BID with modified hold parameters given NADIYA  · Metoprolol 2 5 mg IV prn  · Echo WNL  · VQ scan to r/o PE, negative   · B/l LE duplex for b/l calf pain, negative  · Continue to monitor on telemetry, no events overnight  · Eliquis 5 mg BID

## 2022-03-21 NOTE — ASSESSMENT & PLAN NOTE
Home meds include Lisinopril and HCTZ  · Hold lisinopril given NADIYA with plan to re-start as able  · Metoprolol 25 mg BID initiated in setting of new Afib w/ RVR  · Monitor BPs

## 2022-03-21 NOTE — PROGRESS NOTES
Went to see patient for afternoon rounding; sirena heading out to Xray  Pt reports she "feels funny" in her chest; HR 150s  Tele box uncertain if SVT or afib rhythm; EKG obtained and appears to be a fib w/ RPR (and reading as such)  Reached out to cardiology and Dr Ching Hamilton  /75   Pulse 75   Temp 98 4 °F (36 9 °C) (Oral)   Resp 18   Ht 5' 5" (1 651 m)   Wt 97 1 kg (214 lb)   SpO2 95%   BMI 35 61 kg/m²     Pulse at bedside 156; /75  Per cards, will administer metoprolol 5mg IV now, watch BP, and consider giving AM dose of metoprolol 25mg PO if still elevated  Patient did not take scheduled dose this AM d/t low BP  Plan to re-schedule Xray for later today  HR now 100s per monitor; pt reports feeling better  Will continue to monitor closely  Attempted to call pt's son Missael Paulson at her request; LM that she had elevated HR that came down with mediation      Yun Infante, PGY2

## 2022-03-21 NOTE — CASE MANAGEMENT
Case Management Assessment & Discharge Planning Note    Patient name Jazmyn Sierra  Location [facility] 917/[facility] 912-60 MRN [id number]  : 1950 Date 3/21/2022       Current Admission Date: 3/18/2022  Current Admission Diagnosis:Squamous cell carcinoma of vulva [facility])   Patient Active Problem List    Diagnosis Date Noted    [facility] 20[facility]    Primary hypertension 2022    NADIYA (acute kidney injury) ([facility] ) 2022    Hypotension 2022    History of vulvectomy 2022    Squamous cell carcinoma of vulva ([facility] ) 2022      LOS (days): 3  Geometric Mean LOS (GMLOS) (days):   Days to GMLOS:     OBJECTIVE:    Risk of Unplanned Readmission Score: 9         Current admission status: Inpatient       Preferred Pharmacy:   [facility] [address] [place] [address] - [address] [facility]  [address] [facility] [address]  Phone: [phone] Fax: [address], [address] [facility] [address] [facility]  Phone: [phone] Fax: [phone]    Primary Care Provider: No primary care provider on file  Primary Insurance: [employer]  Secondary Insurance:     ASSESSMENT:  Active Health Care Proxies    There are no active Health Care Proxies on file  Patient Information  Admitted from[de-identified] Home  Mental Status: Alert  During Assessment patient was accompanied by: Not accompanied during assessment  Assessment information provided by[de-identified] Patient  Primary Caregiver: Self  Support Systems: Son,Family members  What city do you live in?: Where  Home entry access options   Select all that apply : Stairs  Number of steps to enter home : 5  Do the steps have railings?: Yes  Type of Current Residence: Other (Comment) (private residence)  Upon entering residence, is there a bedroom on the main floor (no further steps)?: Yes  Upon entering residence, is there a bathroom on the main floor (no further steps)?: Yes  In the last 12 months, was there a time when you were not able to pay the mortgage or rent on time?: No  In the last 12 months, how many places have you lived?: 1  In the last 12 months, was there a time when you did not have a steady place to sleep or slept in a shelter (including now)?: No  Living Arrangements: Lives w/ [place] w/ Extended Family    Activities of Daily Living Prior to Admission  Functional Status: Independent  Completes ADLs independently?: Yes  Ambulates independently?: Yes  Does patient use assisted devices?: Yes  Assisted Devices (DME) used: [employer]  Does patient currently own DME?: Yes  What DME does the patient currently own?: [address]  Does patient have a history of Outpatient Therapy (PT/OT)?: No  Does the patient have a history of Short-Term Rehab?: No  Does patient have a history of HHC?: No  Does patient currently have [facility]?: No         Patient Information Continued  Income Source: Pension/FCI (receives husbands pension)  Does patient have prescription coverage?: Yes  Within the past 12 months, you worried that your food would run out before you got the money to buy more : Never true  Within the past 12 months, the food you bought just didnt last and you didnt have money to get more : Never true  Food insecurity resource given?: N/A  Does patient receive dialysis treatments?: No  Does patient have a history of substance abuse?: No  Does patient have a history of Mental Health Diagnosis?: No         Means of Transportation  Means of Transport to [facility][de-identified] Family transport (states son drives her wherever she needs to go)  In the past 12 months, has lack of transportation kept you from medical appointments or from getting medications?: No  In the past 12 months, has lack of transportation kept you from meetings, work, or from getting things needed for daily living?: No        DISCHARGE DETAILS:    Discharge planning discussed with[de-identified] patient               Other Referral/Resources/Interventions Provided:  Referral Comments: awaiting recommendations          Additional Comments: patient currently with b/l KILLIAN drains and a pacheco in place - per patient she believes that she will be leaving the hospital with only the KILLIAN drains and thinks the pacheco will be removed prior to discharge  Per patient, she is not interested in home health care for care of drains - feels as though she can handle it herself and states that she has a lot of support at time of discharge  / Pt confirms that she has NOT been COVID vaccinated / Rapid Response this am aw well - found to be in SVT with HR in the 170s                    CM reviewed d/c planning process including the following: identifying help at home, patient preference for d/c planning needs, Discharge Lounge, [facility] Meds to Bed program, availability of treatment team to discuss questions or concerns patient and/or family may have regarding understanding medications and recognizing signs and symptoms once discharged  CM also encouraged patient to follow up with all recommended appointments after discharge  Patient advised of importance for patient and family to participate in managing patients medical well being  Patient/caregiver received discharge checklist  Content reviewed  Patient/caregiver encouraged to participate in discharge plan of care prior to discharge home

## 2022-03-21 NOTE — RAPID RESPONSE
Rapid Response Note  Gurpreet Velez 70 y o  female MRN: [id number]  [facility] Encounter: [id number]    Rapid Response Notification(s):   Response called date/time:  3/21/2022 11:47 PM  Response team arrival date/time:  3/21/2022 11:48 PM  Response end date/time:  3/21/2022 12:00 AM  [facility]  Rapid response location:  [facility] unit  Primary reason for rapid response call:  Acute change in heart rhythm    Rapid Response Intervention(s):   Airway:  None  Breathing:  None  Circulation:  None  Fluids administered:  Normal saline  Medications administered:  Adenosine and other (comment) (metoprolol)       Assessment:   · SVT versus atrial flutter     Plan:   · STAT EKG   · Adenosine 6mg   · Metoprolol 2 5mg IV   · Magnesium 2g   · 500cc NS bolus   · SLIM consult   · Continue telemetry   · BMP pending      Rapid Response Outcome:   Transfer:  Remain on floor  Primary service notified of transfer: Yes    Code Status: Level 1 (Full Code)      Family notified of transfer: no  Family member contacted: Jessika Raya and Miquel Diggs      Background/Situation:   Gurpreet Velez is a 70 y o  female who is currently POD 2 from radical vulvectomy with bilateral inguinal femoral lymphadenopathy  RR called due to acute change in heart rhythm  When I arrived to patient's room she was alert and oriented  Denies chest pain or SOB  Patient in SVT per EKG with rate in 170s  Failed vagal maneuver  Patient's   6mg of adenosine administered  Delayed response to medication where heart rate only dropped to 140s post adenosine  Repeat EKG revealed atrial flutter  2g magnesium given in intrum prior to metoprolol 2 5mg IV sent from pharmacy  Metoprolol given and patient's heart rate returned to baseline  Blood pressure within normal limits  Patient remained alert and oriented  Surgery AP TigerTexed and notified  Advised to place SLIM consult  Upon my evaluation, patient stable to remain at current level of care  Son and daughter in law notified via phone  All questions answered  Review of Systems   Respiratory: Negative for shortness of breath  Cardiovascular: Negative for chest pain and palpitations  Psychiatric/Behavioral: Negative for confusion  Objective:   Vitals:    03/20/22 2354 03/20/22 2355 03/20/22 2358 03/21/22 0005   BP: (!) 83/48 (!) 85/47 114/52 (!) 99/48   BP Location:       Pulse: (!) 112  (!) 120 96   Resp:       Temp:       TempSrc:       SpO2:    92%   Weight:       Height:         Physical Exam  Constitutional:       General: She is not in acute distress  Appearance: She is obese  She is not ill-appearing, toxic-appearing or diaphoretic  HENT:      Head: Normocephalic  Nose: Nose normal       Mouth/Throat:      Mouth: Mucous membranes are moist       Pharynx: Oropharynx is clear  Eyes:      General: No scleral icterus  Conjunctiva/sclera: Conjunctivae normal       Pupils: Pupils are equal, round, and reactive to light  Cardiovascular:      Rate and Rhythm: Rhythm irregular  Pulmonary:      Effort: Pulmonary effort is normal  No respiratory distress  Musculoskeletal:         General: No swelling or deformity  Normal range of motion  Right lower leg: No edema  Left lower leg: No edema  Skin:     General: Skin is warm and dry  Neurological:      General: No focal deficit present  Mental Status: She is alert and oriented to person, place, and time  Portions of the record may have been created with voice recognition software  Occasional wrong word or "sound a like" substitutions may have occurred due to the inherent limitations of voice recognition software  Read the chart carefully and recognize, using context, where substitutions have occurred      Maurice Franco PA-C

## 2022-03-21 NOTE — ASSESSMENT & PLAN NOTE
· Cr 1 5, baseline around 0 8  · In setting of hypotension, GI losses from multiple episodes of diarrhea 3/20 as well as n/v with poor po intake post op  · S/p 1L IVF at RR + 500 cc albumin running  · Check UA, consider renal US  · D/c toradol  · Mesa in place, monitor UOP   · Trend BMP

## 2022-03-21 NOTE — CONSULTS
[address] 1950, 70 y o  female MRN: [id number]  Unit/Bed#: [facility] 917-01 Encounter: [id number]  Primary Care Provider: No primary care provider on file  Date and time admitted to hospital: 3/18/2022  8:55 AM    Inpatient consult to Internal Medicine  Consult performed by: Ellie Estrella PA-C  Consult ordered by: Stevie Cabrera PA-C        * Squamous cell carcinoma of vulva (HCC)  Assessment & Plan  · Likely stage III per gyn/onc  · POD #3 radical vulvectomy bilateral inguinal femoral lymphadenectomy   · Some issues with pain mgmt per notes  Pain regimen ordered  · Care per primary team    Atrial fibrillation with RVR [facility])  Assessment & Plan  · RRT early morning of 3/21 for  bpm, s/p adenosine revealing Afib  bpm   · Converted to NSR after 2 5 mg IV metoprolol  Remained asymptomatic  · Check 2D echo, consult cardiology  · D dimer elevated, however in setting of malignancy and now with new onset NADIYA  Check VQ scan to r/o PE  Check b/l LE duplex given patient c/o b/l calf pain  · Start metoprolol 25 mg bid with modified hold parameters given NADIYA  · Monitor on telemetry  · 2 5 mg IV lopressor prn     NADIYA (acute kidney injury) ([facility] [address] )  Assessment & Plan  · Cr 1 5, baseline around 0 8  · In setting of hypotension, GI losses from multiple episodes of diarrhea 3/20 as well as n/v with poor po intake post op  · S/p 1L IVF at RR + 500 cc albumin running  · Check UA, consider renal US  · D/c toradol  · Mesa in place, monitor UOP   · Trend BMP    Primary hypertension  Assessment & Plan  · Maintained on lisinopril 40 mg qd  · Hold lisinopril given NADIYA, restart as able  · Start metoprolol 25 mg bid in setting of new Afib with RVR  · Trend BPs      VTE Prophylaxis: VTE Score: 6 High Risk (Score >/= 5) - Pharmacological DVT Prophylaxis Ordered: enoxaparin (Lovenox)  Sequential Compression Devices Ordered      Recommendations for Discharge:  · Per gyn onc team  Not medically stable for d/c  Counseling / Coordination of Care Time: 45 minutes Greater than 50% of total time spent on patient counseling and coordination of care  Collaboration of Care: Were Recommendations Directly Discussed with Primary Treatment Team? No    History of Present Illness:  Nitesh Kraus is a 70 y o  female with PMHx squamous cell carcinoma of the vulva metastasized to local LNs, primary hypertension, obesity, hx of hep C s/p treatment and cure, arthritis  Also has remote hx of cholecystectomy, hx of endoscopic intervention for residual CBD stones c/b GIB requiring embolization per patient report  She is originally admitted to the gyn/onc service for vulvectomy in setting of squamous cell carcinoma of the vulva  We are consulted for medical management given new onset afib with rvr prompting RRT  RRT was called on 3/21 given SVT with HRs in 150s  She was given adenosine with repeat ekg showing afib with rvr HR 120s, reverted to NSR after 2 5 mg of metoprolol  Additionally received 1L NS bolus given drop in BP, and 2 g Mg  Patient was asymptomatic throughout  She denies any cardiac symptoms at the time, or PTA including CP, SOB, palpitations, BLACKWELL, PND  Reports mild LE edema "during the summer only"  Reports one episode of palpitations at home many years ago that resolved spontaneously  Reports she has never had echocardiogram, did undergo stress testing many years ago which she believes was normal  Denies hx of blood clots  Denies hemoptysis  Does endorse multiple episodes of nonbloody diarrhea tonight  Reports nausea post op with poor appetite since surgery and pain at surgical site which is currently controlled  Review of Systems:   Review of Systems   Respiratory: Negative for shortness of breath  Cardiovascular: Negative for chest pain and palpitations  Gastrointestinal: Positive for diarrhea     Neurological: Negative for dizziness, syncope and light-headedness  All other systems reviewed and are negative  Past Medical and Surgical History:   Past Medical History:   Diagnosis Date    Hypertension        Past Surgical History:   Procedure Laterality Date    CHOLECYSTECTOMY      JOINT REPLACEMENT      left hip       Meds/Allergies:  all medications and allergies reviewed    Allergies: Allergies   Allergen Reactions    Bee Venom Angioedema    Honey - Food Allergy Other (See Comments)    Propranolol Other (See Comments)     Pt unaware of this allergy    Timolol Other (See Comments)     Pt unaware of this allergy    Amoxicillin Rash     ? - has taken PCN since, with no trouble       Social History:  Marital Status: Unknown  Substance Use History:   Social History     Substance and Sexual Activity   Alcohol Use Never     Social History     Tobacco Use   Smoking Status Former Smoker   Smokeless Tobacco Never Used     Social History     Substance and Sexual Activity   Drug Use Never       Family History:  Family History   Problem Relation Age of Onset    Cancer Sister        Physical Exam:   Vitals:   Blood Pressure: (!) 79/45 (03/21/22 0100)  Pulse: 77 (03/21/22 0054)  Temperature: 97 9 °F (36 6 °C) (03/20/22 2104)  Temp Source: Oral (03/20/22 0750)  Respirations: 18 (03/20/22 2104)  Height: 5' 5" (165 1 cm) (03/18/22 0932)  Weight - Scale: 97 1 kg (214 lb) (03/18/22 0932)  SpO2: 93 % (03/21/22 0054)    Physical Exam  Constitutional:       Appearance: Normal appearance  HENT:      Head: Normocephalic  Mouth/Throat:      Mouth: Mucous membranes are dry  Eyes:      Extraocular Movements: Extraocular movements intact  Pupils: Pupils are equal, round, and reactive to light  Cardiovascular:      Rate and Rhythm: Normal rate and regular rhythm  Pulses: Normal pulses  Heart sounds: Normal heart sounds  Pulmonary:      Effort: Pulmonary effort is normal  No respiratory distress  Breath sounds: Normal breath sounds  No wheezing or rales  Abdominal:      General: Abdomen is flat  Bowel sounds are normal  There is no distension  Palpations: Abdomen is soft  Tenderness: There is no abdominal tenderness  There is no guarding  Genitourinary:     Comments: Mesa in place draining yellow urine  Musculoskeletal:         General: Normal range of motion  Cervical back: Normal range of motion and neck supple  Comments: Trace b/l LE edema at ankles, nonpitting   Skin:     General: Skin is warm and dry  Capillary Refill: Capillary refill takes less than 2 seconds  Neurological:      General: No focal deficit present  Mental Status: She is alert and oriented to person, place, and time  Cranial Nerves: No cranial nerve deficit  Motor: No weakness  Psychiatric:         Mood and Affect: Mood normal           Additional Data:   Lab Results:    Results from last 7 days   Lab Units 03/21/22  0048   WBC Thousand/uL 11 11*   HEMOGLOBIN g/dL 11 2*   HEMATOCRIT % 35 5   PLATELETS Thousands/uL 287   NEUTROS PCT % 84*   LYMPHS PCT % 7*   MONOS PCT % 9   EOS PCT % 0     Results from last 7 days   Lab Units 03/21/22  0048 03/20/22  0320 03/20/22  0320   SODIUM mmol/L 139   < > 140   POTASSIUM mmol/L 4 0   < > 3 8   CHLORIDE mmol/L 111*   < > 111*   CO2 mmol/L 21   < > 24   BUN mg/dL 31*   < > 21   CREATININE mg/dL 1 50*   < > 0 86   ANION GAP mmol/L 7   < > 5   CALCIUM mg/dL 8 7   < > 8 7   ALBUMIN g/dL  --   --  3 4*   TOTAL BILIRUBIN mg/dL  --   --  0 39   ALK PHOS U/L  --   --  45*   ALT U/L  --   --  21   AST U/L  --   --  21   GLUCOSE RANDOM mg/dL 150*   < > 97    < > = values in this interval not displayed  No results found for: HGBA1C            Imaging: No pertinent imaging reviewed    VAS lower limb venous duplex study, complete bilateral    (Results Pending)   NM inpatient order    (Results Pending)       EKG, Pathology, and Other Studies Reviewed on Admission:   · EKG: Atrial fibrillation    ** Please Note: This note may have been constructed using a voice recognition system   **

## 2022-03-21 NOTE — ASSESSMENT & PLAN NOTE
· Suspect in setting of volume depletion given n/v and multiple episodes of diarrhea  · Received total of 3 L of fluid resuscitation, BP now improved this /78

## 2022-03-21 NOTE — CONSULTS
Consultation - Cardiology   Suzie Means 70 y o  female MRN: [id number]  Unit/Bed#: [facility] 917-01 Encounter: [id number]      Assessment and Plan:  Principal Problem:    Squamous cell carcinoma of vulva ([facility] )  Active Problems:    Atrial fibrillation with RVR ([facility] )    Primary hypertension    NADIYA (acute kidney injury) ([facility] )    Hypotension    # New onset Atrial fibrillation with RVR  - RRT was called due to HR in 170s  POD 2 of radical vulvectomy  - Patient was asymptomatic    - EKG showed ? SVT during episode  She was given adenosine 6 mg and metoprolol 2 5 mg IV, Mg 2 g and  cc bolus  Repeat EKG showed atrial fibrillation with RVR  - Telemetry reviewed  She is currently on NSR with rate 80s  - No prior echocardiogram  No prior history of CAD  No known history of Afib  - [id number]- VASc score- 3 points (age, sex and HTN)    # Hypertension  - Currently BP is borderline  S/p 2 L IVF bolus and 5% albumin once  - Patient is asymptomatic    - She takes lisinopril 40 mg qd and HCTZ 12 5 mg qd at home  Currently on hold  # Squamous cell carcinoma of vulva   - s/p radical vulvectomy and bilateral inguinal femoral LND POD 3    Plan:  - Follow up with Echocardiogram  - Started low dose metoprolol tartrate 25 mg bid by [facility]  Continue the same    - We would recommend to start [address] if surgery is okay  We discussed the risks and benefits of anticoagulation and patient is agreeable to it  History of Present Illness   Physician Requesting Consult: Clarice Whalen MD  Reason for Consult / Principal Problem: New onset A fib with RVR  HPI: Suzie Means is a 70y o  year old female with PMH of HTN, squamous cell carcinoma of vulva, former smoker who was admitted with squamous cell carcinoma of the vulva  She underwent radical vulvectomy and bilateral inguinal femoral LND on 3/18/22  She was found to have HR at 156, SVT on EKG last night and RRT was called  She was given 6 mg of adenosine   Repeat EKG showed A fib with RVR with rate 120 and given metoprol 25 mg once and converted to NSR  She received IV fluid and albumin for low BP  Patient reports she was having large BM and she went to the bathroom  She was asymptomatic otherwise  She denies any chest pain, palpitation, lightheadedness or dizziness at time  Currently, patient is comfortable  Reports she is feeling okay  She denies any personal history of irregular heart rate, any history of CAD  No cardiac evaluation recently  She report her parents had history of stroke  Inpatient consult to Cardiology  Consult performed by: Wilver Britton MD  Consult ordered by: Jemal Alegre PA-C          Review of Systems:  Review of Systems   Constitutional: Negative for activity change, appetite change, chills, fatigue and fever  HENT: Negative  Respiratory: Negative for cough, chest tightness, shortness of breath and wheezing  Cardiovascular: Negative for chest pain and leg swelling  Gastrointestinal: Positive for diarrhea  Negative for nausea and vomiting  Neurological: Negative for dizziness, syncope, light-headedness and headaches  Psychiatric/Behavioral: Negative for decreased concentration and sleep disturbance  The patient is not nervous/anxious            Historical Information   Past Medical History:   Diagnosis Date    Hypertension      Past Surgical History:   Procedure Laterality Date    CHOLECYSTECTOMY      JOINT REPLACEMENT      left hip     Social History     Substance and Sexual Activity   Alcohol Use Never     Social History     Substance and Sexual Activity   Drug Use Never     Social History     Tobacco Use   Smoking Status Former Smoker   Smokeless Tobacco Never Used     Family History:   Family History   Problem Relation Age of Onset    Cancer Sister        Meds/Allergies   all current active meds have been reviewed  Allergies   Allergen Reactions    Bee Venom Angioedema    Honey - Food Allergy Other (See Comments)    Propranolol Other (See Comments)     Pt unaware of this allergy    Timolol Other (See Comments)     Pt unaware of this allergy    Amoxicillin Rash     ? - has taken PCN since, with no trouble       Objective   Vitals: Blood pressure (!) 112/48, pulse 75, temperature 98 4 °F (36 9 °C), temperature source Oral, resp  rate 18, height 5' 5" (1 651 m), weight 97 1 kg (214 lb), SpO2 95 %  , Body mass index is 35 61 kg/m² ,   Orthostatic Blood Pressures      Most Recent Value   Blood Pressure 112/48 filed at 03/21/2022 1250   Patient Position - Orthostatic VS Lying filed at 03/21/2022 0815            Intake/Output Summary (Last 24 hours) at 3/21/2022 1255  Last data filed at 3/21/2022 0901  Gross per 24 hour   Intake 500 ml   Output 1080 ml   Net -580 ml       Invasive Devices  Report    Peripheral Intravenous Line            Peripheral IV 03/21/22 Dorsal (posterior); Right Wrist <1 day          Drain            Urethral Catheter Non-latex 16 Fr  3 days    Closed/Suction Drain Left Groin Bulb 2 days    Closed/Suction Drain Right Groin Bulb 2 days                    Physical Exam:  Physical Exam  Vitals and nursing note reviewed  Constitutional:       Appearance: Normal appearance  HENT:      Head: Normocephalic and atraumatic  Eyes:      Extraocular Movements: Extraocular movements intact  Cardiovascular:      Rate and Rhythm: Normal rate and regular rhythm  Pulses: Normal pulses  Heart sounds: Normal heart sounds  No murmur heard  No gallop  Pulmonary:      Effort: Pulmonary effort is normal  No respiratory distress  Breath sounds: Normal breath sounds  No wheezing  Abdominal:      General: Bowel sounds are normal  There is no distension  Palpations: Abdomen is soft  Tenderness: There is no abdominal tenderness  Musculoskeletal:      Right lower leg: No edema  Left lower leg: No edema  Skin:     General: Skin is warm and dry  Capillary Refill: Capillary refill takes less than 2 seconds  Neurological:      General: No focal deficit present  Mental Status: She is alert  Psychiatric:         Mood and Affect: Mood normal            Lab Results:     No results found for: CKTOTAL, CKMB, CKMBINDEX, TROPONINI    Lab Results   Component Value Date    CALCIUM 8 7 03/21/2022    K 3 9 03/21/2022    CO2 23 03/21/2022     (H) 03/21/2022    BUN 28 (H) 03/21/2022    CREATININE 1 34 (H) 03/21/2022       Lab Results   Component Value Date    WBC 9 61 03/21/2022    HGB 11 1 (L) 03/21/2022    HCT 35 1 03/21/2022    MCV 99 (H) 03/21/2022     03/21/2022       No results found for: CHOL  No results found for: HDL  No results found for: LDLCALC  No results found for: TRIG    Lab Results   Component Value Date    ALT 26 03/21/2022    AST 26 03/21/2022               Imaging: I have personally reviewed pertinent reports  EKG: Atrial fibrillation with RVR  Tele reviewed   NSR

## 2022-03-22 LAB
ALBUMIN SERPL BCP-MCNC: 3.1 G/DL (ref 3.5–5)
ALP SERPL-CCNC: 46 U/L (ref 46–116)
ALT SERPL W P-5'-P-CCNC: 19 U/L (ref 12–78)
ANION GAP SERPL CALCULATED.3IONS-SCNC: 2 MMOL/L (ref 4–13)
AST SERPL W P-5'-P-CCNC: 18 U/L (ref 5–45)
BILIRUB SERPL-MCNC: 0.41 MG/DL (ref 0.2–1)
BUN SERPL-MCNC: 13 MG/DL (ref 5–25)
CALCIUM ALBUM COR SERPL-MCNC: 9.4 MG/DL (ref 8.3–10.1)
CALCIUM SERPL-MCNC: 8.7 MG/DL (ref 8.3–10.1)
CHLORIDE SERPL-SCNC: 115 MMOL/L (ref 100–108)
CO2 SERPL-SCNC: 26 MMOL/L (ref 21–32)
CREAT SERPL-MCNC: 0.69 MG/DL (ref 0.6–1.3)
ERYTHROCYTE [DISTWIDTH] IN BLOOD BY AUTOMATED COUNT: 12.9 % (ref 11.6–15.1)
GFR SERPL CREATININE-BSD FRML MDRD: 87 ML/MIN/1.73SQ M
GLUCOSE SERPL-MCNC: 100 MG/DL (ref 65–140)
HCT VFR BLD AUTO: 31.6 % (ref 34.8–46.1)
HGB BLD-MCNC: 9.9 G/DL (ref 11.5–15.4)
MCH RBC QN AUTO: 31 PG (ref 26.8–34.3)
MCHC RBC AUTO-ENTMCNC: 31.3 G/DL (ref 31.4–37.4)
MCV RBC AUTO: 99 FL (ref 82–98)
PLATELET # BLD AUTO: 269 THOUSANDS/UL (ref 149–390)
PMV BLD AUTO: 9.5 FL (ref 8.9–12.7)
POTASSIUM SERPL-SCNC: 4.2 MMOL/L (ref 3.5–5.3)
PROT SERPL-MCNC: 6.1 G/DL (ref 6.4–8.2)
RBC # BLD AUTO: 3.19 MILLION/UL (ref 3.81–5.12)
SODIUM SERPL-SCNC: 143 MMOL/L (ref 136–145)
WBC # BLD AUTO: 7.11 THOUSAND/UL (ref 4.31–10.16)

## 2022-03-22 PROCEDURE — 99232 SBSQ HOSP IP/OBS MODERATE 35: CPT | Performed by: GENERAL PRACTICE

## 2022-03-22 PROCEDURE — 80053 COMPREHEN METABOLIC PANEL: CPT

## 2022-03-22 PROCEDURE — 99024 POSTOP FOLLOW-UP VISIT: CPT | Performed by: OBSTETRICS & GYNECOLOGY

## 2022-03-22 PROCEDURE — 99232 SBSQ HOSP IP/OBS MODERATE 35: CPT | Performed by: INTERNAL MEDICINE

## 2022-03-22 PROCEDURE — 85027 COMPLETE CBC AUTOMATED: CPT

## 2022-03-22 RX ORDER — ACETAMINOPHEN 325 MG/1
650 TABLET ORAL EVERY 6 HOURS SCHEDULED
Refills: 0
Start: 2022-03-22

## 2022-03-22 RX ADMIN — ACETAMINOPHEN 325MG 650 MG: 325 TABLET ORAL at 18:26

## 2022-03-22 RX ADMIN — ACETAMINOPHEN 325MG 650 MG: 325 TABLET ORAL at 06:15

## 2022-03-22 RX ADMIN — ACETAMINOPHEN 325MG 650 MG: 325 TABLET ORAL at 23:00

## 2022-03-22 RX ADMIN — METOPROLOL TARTRATE 25 MG: 25 TABLET, FILM COATED ORAL at 22:34

## 2022-03-22 RX ADMIN — APIXABAN 5 MG: 5 TABLET, FILM COATED ORAL at 18:25

## 2022-03-22 RX ADMIN — ACETAMINOPHEN 325MG 650 MG: 325 TABLET ORAL at 12:41

## 2022-03-22 RX ADMIN — APIXABAN 5 MG: 5 TABLET, FILM COATED ORAL at 08:35

## 2022-03-22 RX ADMIN — METOPROLOL TARTRATE 25 MG: 25 TABLET, FILM COATED ORAL at 08:35

## 2022-03-22 RX ADMIN — HEPARIN SODIUM 5000 UNITS: 5000 INJECTION INTRAVENOUS; SUBCUTANEOUS at 06:16

## 2022-03-22 RX ADMIN — OXYCODONE HYDROCHLORIDE 5 MG: 5 TABLET ORAL at 12:41

## 2022-03-22 NOTE — PROGRESS NOTES
[address] [facility]  Progress Note Patricia Copeland 1950, 70 y o  female MRN: [id number]  Unit/Bed#: [facility] 917-01 Encounter: [id number]  Primary Care Provider: No primary care provider on file  Date and time admitted to hospital: 3/18/2022  8:55 AM    * Squamous cell carcinoma of vulva (HCC)  Assessment & Plan  · Likely stage III per gyn/onc  · POD #3 radical vulvectomy bilateral inguinal femoral lymphadenectomy   · Some issues with pain mgmt per notes  Pain regimen ordered  · Care per primary team    Hypotension  Assessment & Plan  · Suspect in setting of volume depletion given n/v and multiple episodes of diarrhea  · Received total of 3 L of fluid resuscitation, BP now improved this /78    NADIYA (acute kidney injury) ([facility] )  Assessment & Plan  · Estimated Creatinine Clearance: 86 2 mL/min (by C-G formula based on SCr of 0 69 mg/dL)  · Cr 1 5 ojn 3/21, baseline around 0 8  · In setting of hypotension, GI losses from multiple episodes of diarrhea 3/20 as well as n/v with poor po intake post op  · S/p 1L IVF at RR + 500 cc albumin  · UA innumerable RBC -per gyn onc  If Cr worsens consider renal US  · D/c toradol  · Mesa in place, monitor UOP   · Trend BMP  · NADIYA now resolved    Primary hypertension  Assessment & Plan  · Maintained on lisinopril 40 mg qd  · Hold lisinopril and HCTZ given NADIYA  · Start metoprolol 25 mg bid in setting of new Afib with RVR  · Trend BPs    Atrial fibrillation with RVR ([facility] )  Assessment & Plan  · RRT early morning of 3/21 for  bpm, s/p adenosine revealing Afib  bpm   · Converted to NSR after 2 5 mg IV metoprolol  Remained asymptomatic  · Echo WNL  · Cardio appreciated  · D dimer elevated, however in setting of malignancy and now with new onset NADIYA   VQ scan low probability of PE  b/l LE duplex no DVT  · C/w metoprolol 25 mg bid  · Monitor on telemetry  · 2 5 mg IV lopressor prn         VTE Pharmacologic Prophylaxis: VTE Score: 6 High Risk (Score >/= 5) - Pharmacological DVT Prophylaxis Ordered: apixaban (Eliquis)  Sequential Compression Devices Ordered  Patient Centered Rounds: I performed bedside rounds with nursing staff today  Discussions with Specialists or Other Care Team Provider: GynOnc    Education and Discussions with Family / Patient: not today  Time Spent for Care: 30 minutes  More than 50% of total time spent on counseling and coordination of care as described above  Current Length of Stay: 4 day(s)  Current Patient Status: Inpatient     Discharge Plan: [facility] is following this patient on consult  They are medically stable for discharge when deemed appropriate by primary service  Please price check Eliquis before d/c    Code Status: Level 1 - Full Code    Subjective:   No acute complaints    Objective:     Vitals:   Temp (24hrs), Av 6 °F (37 °C), Min:98 4 °F (36 9 °C), Max:98 7 °F (37 1 °C)    Temp:  [98 4 °F (36 9 °C)-98 7 °F (37 1 °C)] 98 4 °F (36 9 °C)  HR:  [74-95] 76  Resp:  [17-18] 17  BP: (111-129)/(38-50) 120/50  SpO2:  [94 %-97 %] 94 %  Body mass index is 35 61 kg/m²  Input and Output Summary (last 24 hours): Intake/Output Summary (Last 24 hours) at 3/22/2022 1616  Last data filed at 3/22/2022 1232  Gross per 24 hour   Intake 1000 ml   Output 2005 ml   Net -1005 ml       Physical Exam:   Physical Exam  HENT:      Head: Normocephalic and atraumatic  Nose: Nose normal       Mouth/Throat:      Mouth: Mucous membranes are moist    Eyes:      Extraocular Movements: Extraocular movements intact  Conjunctiva/sclera: Conjunctivae normal    Cardiovascular:      Rate and Rhythm: Normal rate and regular rhythm  Pulmonary:      Effort: Pulmonary effort is normal       Breath sounds: Normal breath sounds  No wheezing or rales  Abdominal:      General: Bowel sounds are normal  There is no distension  Palpations: Abdomen is soft  Tenderness: There is no abdominal tenderness     Musculoskeletal: General: Normal range of motion  Cervical back: Normal range of motion and neck supple  Right lower leg: No edema  Left lower leg: No edema  Skin:     General: Skin is warm and dry  Neurological:      Mental Status: She is alert and oriented to person, place, and time  Additional Data:     Labs:  Results from last 7 days   Lab Units 03/22/22  0808 03/21/22  0850 03/21/22  0048   WBC Thousand/uL 7 11   < > 11 11*   HEMOGLOBIN g/dL 9 9*   < > 11 2*   HEMATOCRIT % 31 6*   < > 35 5   PLATELETS Thousands/uL 269   < > 287   NEUTROS PCT %  --   --  84*   LYMPHS PCT %  --   --  7*   MONOS PCT %  --   --  9   EOS PCT %  --   --  0    < > = values in this interval not displayed  Results from last 7 days   Lab Units 03/22/22  0808   SODIUM mmol/L 143   POTASSIUM mmol/L 4 2   CHLORIDE mmol/L 115*   CO2 mmol/L 26   BUN mg/dL 13   CREATININE mg/dL 0 69   ANION GAP mmol/L 2*   CALCIUM mg/dL 8 7   ALBUMIN g/dL 3 1*   TOTAL BILIRUBIN mg/dL 0 41   ALK PHOS U/L 46   ALT U/L 19   AST U/L 18   GLUCOSE RANDOM mg/dL 100                 Results from last 7 days   Lab Units 03/21/22  0432   LACTIC ACID mmol/L 1 2       Lines/Drains:  Invasive Devices  Report    Peripheral Intravenous Line            Peripheral IV 03/21/22 Dorsal (posterior); Right Wrist 1 day          Drain            Closed/Suction Drain Left Groin Bulb 4 days    Closed/Suction Drain Right Groin Bulb 4 days                  Telemetry:  Telemetry Orders (From admission, onward)             48 Hour Telemetry Monitoring  Continuous x 48 hours        Expiring   References:    Telemetry Guidelines   Question:  Reason for 48 Hour Telemetry  Answer:  Arrhythmias Requiring Medical Therapy (eg  SVT, Vtach/fib, Bradycardia, Uncontrolled A-fib)                 Telemetry Reviewed: Normal Sinus Rhythm  Indication for Continued Telemetry Use: Arrthymias requiring medical therapy             Imaging: No pertinent imaging reviewed      Recent Cultures (last 7 days):         Last 24 Hours Medication List:   Current Facility-Administered Medications   Medication Dose Route Frequency Provider Last Rate    acetaminophen  650 mg Oral Q6H [facility] [place] Tremaine Rater, MD      apixaban  5 mg Oral BID Ping Harry MD      docusate sodium  100 mg Oral BID Tremaine Rater, MD      HYDROmorphone  0 5 mg Intravenous Q4H PRN Tremaine Damon, MD      metoclopramide  10 mg Intravenous Q6H PRN Ping Harry MD      metoprolol  5 mg Intravenous Q6H PRN Wally Wallace MD      metoprolol tartrate  25 mg Oral Q12H Miguel Horvath MD      ondansetron  4 mg Intravenous Q6H PRN Tremaine Rater, MD      oxyCODONE  2 5 mg Oral Q4H PRN Tremaine RaterMD Jaquan oxyCODONE  5 mg Oral Q4H PRN Tremaine Rater, MD      simethicone  80 mg Oral Q6H PRN Anders Kam PA-C          Today, Patient Was Seen By: Kilo Man DO    **Please Note: This note may have been constructed using a voice recognition system  **

## 2022-03-22 NOTE — PROGRESS NOTES
Cardiology Progress Note - Bonnie Cousins 70 y o  female MRN: [id number]    Unit/Bed#: [facility] 917-01 Encounter: [id number]      Assessment & Plan:  Principal Problem:    Squamous cell carcinoma of vulva ([facility] )  Active Problems:    Atrial fibrillation with RVR ([facility] )    Primary hypertension    NADIYA (acute kidney injury) ([facility] )    Hypotension        # New onset Atrial fibrillation with RVR  - Admitted with squamous cell carcinoma of the vulva s/p radical vulvectomy and LND  Went into A fib with RVR on POD 2    - Echocardiogram showed normal EF and no structural abnormality    - SCM1OP1- VASc score- 3 points (age, sex and HTN)  - Started metoprolol tartrate 25 mg bid and eliquis 5 mg bid  - Another episode yesterday  She was symptomatic at this time  Received metoprolol IV 5 mg    - Currently normal sinus rhythm, rate at 70s     # Hypertension  - Currently BP is borderline     - Patient is asymptomatic    - She takes lisinopril 40 mg qd and HCTZ 12 5 mg qd at home  Currently on hold       # Squamous cell carcinoma of vulva   - s/p radical vulvectomy and bilateral inguinal femoral LND POD 4     Plan:  - Continue low dose metoprolol tartrate 25 mg bid with adjusted holding parameter    - Started Eliquis 5 mg bid today  - Could be considered antiarrythmic such as flecainide if she run into A Fib again  Subjective:   Patient seen and examined  No significant events overnight  Denies chest pain, pnd, orthopnea, abdominal pain, nausea vomiting, fever, chills, headache, dizziness or palpitations currently  She reports she was symptomatic yesterday when she went into A Fib: feeling funny in her head and palpitation  She was moving to stretcher from her bed at that time  She continues to have large watery diarrhea today and slightly improved today  Objective:     Vitals: Blood pressure (!) 129/49, pulse 95, temperature 98 6 °F (37 °C), resp  rate 18, height 5' 5" (1 651 m), weight 97 1 kg (214 lb), SpO2 97 %  , Body mass index is 35 61 kg/m² ,   Orthostatic Blood Pressures      Most Recent Value   Blood Pressure 129/49 filed at 03/22/2022 4367   Patient Position - Orthostatic VS Lying filed at 03/21/2022 0815            Intake/Output Summary (Last 24 hours) at 3/22/2022 1121  Last data filed at 3/22/2022 0800  Gross per 24 hour   Intake 1000 ml   Output 1795 ml   Net -795 ml           Physical Exam:    GEN: Silvino Hi appears well, alert and oriented x 3, pleasant and cooperative   HEENT: anicteric, mucous membranes moist  NECK: no jvd, carotid bruits   HEART: regular rhythm, normal S1 and S2, no murmurs, clicks, gallops or rubs   LUNGS: clear to auscultation bilaterally; no wheezes, rales, or rhonchi   ABDOMEN: normal bowel sounds, soft, no tenderness, no distention  EXTREMITIES: peripheral pulses normal; no clubbing, cyanosis, or edema  NEURO: no focal findings   SKIN: bruises and ecchymosis on left arm secondary to blood draw      Current Facility-Administered Medications:     acetaminophen (TYLENOL) tablet 650 mg, 650 mg, Oral, Q6H [address], Luc Dawson MD, 650 mg at 03/22/22 0615    apixaban (ELIQUIS) tablet 5 mg, 5 mg, Oral, BID, Suman AGUIAR MD, 5 mg at 03/22/22 0835    docusate sodium (COLACE) capsule 100 mg, 100 mg, Oral, BID, Luc Dawson MD, 100 mg at 03/20/22 0915    HYDROmorphone (DILAUDID) injection 0 5 mg, 0 5 mg, Intravenous, Q4H PRN, Luc Dawson MD, 0 5 mg at 03/20/22 0920    metoclopramide (REGLAN) injection 10 mg, 10 mg, Intravenous, Q6H PRN, Suman AGUIAR MD, 10 mg at 03/20/22 1802    metoprolol (LOPRESSOR) injection 5 mg, 5 mg, Intravenous, Q6H PRN, Betty Rubio MD    metoprolol tartrate (LOPRESSOR) tablet 25 mg, 25 mg, Oral, Q12H [address], Betty Rubio MD, 25 mg at 03/22/22 0835    ondansetron (ZOFRAN) injection 4 mg, 4 mg, Intravenous, Q6H PRN, Luc Dawson MD, 4 mg at 03/20/22 1623    oxyCODONE (ROXICODONE) IR tablet 2 5 mg, 2 5 mg, Oral, Q4H PRN **OR** oxyCODONE (ROXICODONE) IR tablet 5 mg, 5 mg, Oral, Q4H PRN, Nelia Koroma MD, 5 mg at 03/21/22 2240    simethicone (MYLICON) chewable tablet 80 mg, 80 mg, Oral, Q6H PRN, Maia Alston PA-C, 80 mg at 03/20/22 1802    Labs & Results:    No results found for: CKTOTAL, CKMB, CKMBINDEX, TROPONINI    Lab Results   Component Value Date    CALCIUM 8 7 03/22/2022    K 4 2 03/22/2022    CO2 26 03/22/2022     (H) 03/22/2022    BUN 13 03/22/2022    CREATININE 0 69 03/22/2022       Lab Results   Component Value Date    WBC 7 11 03/22/2022    HGB 9 9 (L) 03/22/2022    HCT 31 6 (L) 03/22/2022    MCV 99 (H) 03/22/2022     03/22/2022           No results found for: CHOL  No results found for: HDL  No results found for: LDLCALC  No results found for: TRIG    Lab Results   Component Value Date    ALT 19 03/22/2022    AST 18 03/22/2022         EKG personally reviewed by )Mona Lima MD  No acute changes   TELE: reviewed

## 2022-03-22 NOTE — PROGRESS NOTES
Pastoral Care Progress Note    3/22/2022  Patient: Bambi Whatley : 1950  Admission Date & Time: 3/18/2022 0855  MRN: [id number] CSN: [id number]        Visited with Sasha per referral from [facility]  Sasha expressed a strong sameer and an acceptance of her medical condition  Prayer offered/accepted  Suresh Sheffield has strong support from her son Nellie Reynolds (per Hillery Scrape he is a 'gem'), his wife Andrea Gallegos and their two children, [facility] and [place]  Follow-up as needed/requested   2             Chaplaincy Interventions Utilized:   Empowerment: Encouraged focus on present    Exploration: Explored spiritual needs & resources    Relationship Building: Cultivated a relationship of care and support and Listened empathically    Ritual: Provided prayer      Chaplaincy Outcomes Achieved:  Expressed gratitude      Spiritual Coping Strategies Utilized:   Spiritual comfort           22 1300   Spiritual Beliefs/Perceptions   Support Systems Son;Daughter;Children   Psychosocial   Length of Time/Family Visitation 16-30 min   Coping Responses   Patient Coping Accepting

## 2022-03-22 NOTE — ASSESSMENT & PLAN NOTE
· Maintained on lisinopril 40 mg qd  · Hold lisinopril and HCTZ given NADIYA  · Start metoprolol 25 mg bid in setting of new Afib with RVR  · Trend BPs

## 2022-03-22 NOTE — PROGRESS NOTES
For questions/concerns on this patient, please reach out to the following:  SLB-OB GYN-GynOnc- Resident/AP  Gyn Oncology Progress note   Lisa Filter 70 y o  female MRN: [id number]  Unit/Bed#: [facility] 917-01 Encounter: [id number]    Assessment/Plan:    70 y o  with stage II vulvar cancer now POD# 4 from a radical vulvectomy and bilateral inguinal lymph node dissection  Now with new onset rapid atrial fibrillation requiring medication  Atrial fibrillation with RVR [facility])  Assessment & Plan  Rapid response 3/21/22 early am for SVT, s/p adenosine revealing Afib  bpm   Converted to NSR after metoprolol 2 5 mg IV  Patient remained asymptomatic throughout  D-dimer elevated in s/o malignancy and new onset NADIYA  · S/p SLIM and cardiology consults, appreciate recs  · Metoprolol 25 mg BID with modified hold parameters given NADIYA  · Metoprolol 2 5 mg IV prn  · Echo WNL  · VQ scan to r/o PE, F/u results   · L/l LE duplex for b/l calf pain, results negative  · Continue to monitor on telemetry, no events overnight  · Hemoglobin stable at 11 and now POD#4, will start Eliquis 5 mg BID     * Squamous cell carcinoma of vulva ([facility] )  Assessment & Plan  69 yo POD#4 from radical vulvectomy & b/l inguinal femoral LND for 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, likely now stage III based on suspicious lymph nodes noted intra-op  · Pain: Tylenol mahesh, jen & dilaudid prn  · TLD: Mesa catheter, b/l KILLIAN drains, peripheral IV  ·  FEN: regular diet  · GI: Colace BID  · DVT ppx: SCDs, heparin TID    NADIYA (acute kidney injury) ([facility] )  Assessment & Plan  Cr newly elevated to 1 5 with baseline of 0 8  In setting of hypotension, GI losses from multiple episodes of diarrhea 3/20/22, & N/V with poor PO intake post op  S/p 1L IVF at RR + 500 cc albumin running  Toradol discontinued    · UA small leuk and negative nitrites, consider renal US  · Monitor UOP  · Cr 1 5-->1 34 yesterday PM, AM still pending   · Trend Cr on daily CMP      Primary hypertension  Assessment & Plan  Home meds include Lisinopril and HCTZ  · Hold lisinopril given NADIYA with plan to re-start as able  · Metoprolol 25 mg BID initiated in setting of new Afib with RVR  · Monitor BPs         Subjective:    Sasha Valdez has no current complaints  Pain is well controlled with medication  She does report some discomfort and burning in the urethra  Patient is currently voiding with pacheco in place  She denies any further palpitations or episodes of heart flutter since the event yesterday afternoon  She is ambulating  Patient is currently passing flatus and reports three episodes of diarrhea yesterday, which she reports is baseline for her  She is tolerating PO, and denies nausea or vomitting  Patient denies fever, chills, chest pain, shortness of breath, or calf tenderness  Objective:  BP (!) 112/38   Pulse 79   Temp 98 6 °F (37 °C) (Oral)   Resp 18   Ht 5' 5" (1 651 m)   Wt 97 1 kg (214 lb)   SpO2 94%   BMI 35 61 kg/m²     I/O last 3 completed shifts: In: 1500 [IV Piggyback:1500]  Out: 8278 [Urine:2000; Drains:285]  No intake/output data recorded  Lab Results   Component Value Date    WBC 9 61 03/21/2022    HGB 11 1 (L) 03/21/2022    HCT 35 1 03/21/2022    MCV 99 (H) 03/21/2022     03/21/2022       Lab Results   Component Value Date    CALCIUM 8 7 03/21/2022    K 3 9 03/21/2022    CO2 23 03/21/2022     (H) 03/21/2022    BUN 28 (H) 03/21/2022    CREATININE 1 34 (H) 03/21/2022           Physical Exam  Constitutional:       General: She is not in acute distress  Appearance: She is well-developed  She is not ill-appearing or toxic-appearing  HENT:      Head: Normocephalic and atraumatic  Pulmonary:      Effort: Pulmonary effort is normal  No respiratory distress  Breath sounds: No stridor  Abdominal:      Tenderness: There is no abdominal tenderness  There is no guarding        Comments: Bilateral groin incisions C/D/I; bilateral KILLIAN drains with serosanguineous drainage   Musculoskeletal:         General: No tenderness  Skin:     General: Skin is warm and dry  Neurological:      General: No focal deficit present  Mental Status: She is alert and oriented to person, place, and time  Mental status is at baseline  Psychiatric:         Mood and Affect: Mood normal          Behavior: Behavior normal          Thought Content:  Thought content normal            Arturo Shafer MD  3/22/2022  7:53 AM

## 2022-03-22 NOTE — UTILIZATION REVIEW
Continued Stay Review    Date: 3/22/2022                        Current Patient Class: inpatient  Current Level of Care: med/surg  HPI:71 y o  female initially admitted on 3/18   Assessment/Plan: POD #4 s/p  Postoperative course complicated by atrial fibrillation with rapid ventricular response  Pt reports some discomfort and burning in the urethra, currently voiding with pacheco in place  She denies any further palpitations or episodes of heart flutter since the event yesterday afternoon  She is ambulating  Passing flatus and reports three episodes of diarrhea yesterday, which she reports is baseline for her  Tolerating po, denies n/v  She is currently on metoprolol 25 mg b i d  with good rate control, however, she has hypotension, but asymptomatic  Per cardiology consider antiarrythmic such as flecainide if she runs into A-fib again  A m  cortisol is 11  V/Q scan is pending, lower extremity Doppler negative for DVT  From a postoperative standpoint, she is at low risk of postop bleeding  Start Eliquis  Continue pacheco cath  Cr improved, continue to monitor  Continue Tylenol mahesh, prn jen and dilaudid  Reg diet  Stool softeners  SCDs/SQH       Vital Signs:   Date/Time Temp Pulse Resp BP MAP (mmHg) SpO2 O2 Device   03/22/22 11:59:40 98 7 °F (37 1 °C) 74 17 118/50 73 95 % --   03/21/22 15:42:38 -- 82 -- 116/43 Abnormal  67 96 % --   03/21/22 13:14:31 -- 150 Abnormal  -- 143/75 98 -- --   03/21/22 1250 -- 75 -- 112/48 Abnormal  -- -- --   03/21/22 08:15:02 98 4 °F (36 9 °C) 83 18 112/48 Abnormal  69 95 % None (Room air)   03/21/22 08:14:40 98 4 °F (36 9 °C) -- -- 112/48 Abnormal  69 -- --   03/21/22 0609 -- -- -- 84/55 Abnormal  -- -- --   03/21/22 06:04:45 -- 77 -- 85/41 Abnormal  56 98 % None (Room air)   03/21/22 0100 -- -- -- 79/45 Abnormal  -- -- --   03/21/22 00:54:26 -- 77 -- 74/36 Abnormal  49 93 % --   03/21/22 00:05:44 -- 96 -- 99/48 Abnormal  -- 92 % --   03/20/22 23:58:21 -- 120 Abnormal  -- 114/52 -- -- --   03/20/22 23:55:45 -- -- -- 85/47 Abnormal  -- -- --   03/20/22 23:54:44 -- 112 Abnormal  -- 83/48 Abnormal  -- -- --   03/20/22 23:54:19 -- 146 Abnormal  -- -- -- 94 % --   03/20/22 23:42:54 -- 106 Abnormal  -- 145/57 86 94 % --   03/20/22 23:29:59 -- 140 Abnormal  -- 133/104 Abnormal  114 95 % None (Room air)   03/20/22 23:29:44 -- 95 -- 133/104 Abnormal  114 94 % --   03/20/22 21:04:57 97 9 °F (36 6 °C) 82 18 92/46 Abnormal  61 91 % --   03/20/22 07:50:58 98 2 °F (36 8 °C) 74 18 131/59 83 95 % None (Room air)       Pertinent Labs/Diagnostic Results:   Results from last 7 days   Lab Units 03/22/22  0808 03/21/22  0850 03/21/22  0048 03/20/22  0320 03/20/22  0320 03/19/22  0502 03/19/22  0502   WBC Thousand/uL 7 11 9 61 11 11*   < > 8 13   < > 9 87   HEMOGLOBIN g/dL 9 9* 11 1* 11 2*  --  11 0*  --  11 2*   HEMATOCRIT % 31 6* 35 1 35 5  --  33 8*  --  35 3   PLATELETS Thousands/uL 269 285 287   < > 293   < > 280   NEUTROS ABS Thousands/µL  --   --  9 27*  --   --   --   --     < > = values in this interval not displayed       Results from last 7 days   Lab Units 03/22/22  0808 03/21/22  0850 03/21/22  0048 03/20/22  0320 03/19/22  0502   SODIUM mmol/L 143 141 139 140 139   POTASSIUM mmol/L 4 2 3 9 4 0 3 8 4 4   CHLORIDE mmol/L 115* 113* 111* 111* 112*   CO2 mmol/L 26 23 21 24 18*   ANION GAP mmol/L 2* 5 7 5 9   BUN mg/dL 13 28* 31* 21 23   CREATININE mg/dL 0 69 1 34* 1 50* 0 86 0 82   EGFR ml/min/1 73sq m 87 39 34 68 72   CALCIUM mg/dL 8 7 8 7 8 7 8 7 8 9   MAGNESIUM mg/dL  --   --  3 7*  --   --      Results from last 7 days   Lab Units 03/22/22  0808 03/21/22  0850 03/20/22  0320 03/19/22  0502   AST U/L 18 26 21 21   ALT U/L 19 26 21 19   ALK PHOS U/L 46 50 45* 43*   TOTAL PROTEIN g/dL 6 1* 6 6 6 9 6 8   ALBUMIN g/dL 3 1* 3 5 3 4* 3 4*   TOTAL BILIRUBIN mg/dL 0 41 0 56 0 39 0 43     Results from last 7 days   Lab Units 03/22/22  0808 03/21/22  0850 03/21/22  0048 03/20/22  0320 03/19/22  0502   GLUCOSE RANDOM mg/dL 100 142* 150* 97 111     Results from last 7 days   Lab Units 03/21/22  0048   D-DIMER QUANTITATIVE ug/ml FEU 11 29*     Results from last 7 days   Lab Units 03/21/22  0048   TSH 3RD GENERATON uIU/mL 0 737     Results from last 7 days   Lab Units 03/21/22  0432   LACTIC ACID mmol/L 1 2     Results from last 7 days   Lab Units 03/21/22  0321   CLARITY UA  Extra Turbid   COLOR UA  [place]   SPEC GRAV UA  1 023   PH UA  5 5   GLUCOSE UA mg/dl Negative   KETONES UA mg/dl Negative   BLOOD UA  Large*   PROTEIN UA mg/dl 100 (2+)*   NITRITE UA  Negative   BILIRUBIN UA  Negative   UROBILINOGEN UA (BE) mg/dl 2 0*   LEUKOCYTES UA  Small*   WBC UA /hpf 30-50*   RBC UA /hpf Innumerable*   BACTERIA UA /hpf Moderate*   EPITHELIAL CELLS WET PREP /hpf Occasional   MUCUS THREADS  Occasional*       Medications:   Scheduled Medications:  acetaminophen, 650 mg, Oral, Q6H LLUVIA  apixaban, 5 mg, Oral, BID  docusate sodium, 100 mg, Oral, BID  metoprolol tartrate, 25 mg, Oral, Q12H [address]     PRN Meds:  HYDROmorphone, 0 5 mg, Intravenous, Q4H PRN 3/20 x2  metoclopramide, 10 mg, Intravenous, Q6H PRN  metoprolol, 5 mg, Intravenous, Q6H PRN 3/20 x1  ondansetron, 4 mg, Intravenous, Q6H PRN 3/20 x1  oxyCODONE, 2 5 mg, Oral, Q4H PRN   Or  oxyCODONE, 5 mg, Oral, Q4H PRN 3/20 x3, 3/21 x3  simethicone, 80 mg, Oral, Q6H PRN        Discharge Plan: D    Network Utilization Review Department  ATTENTION: Please call with any questions or concerns to [phone] and carefully listen to the prompts so that you are directed to the right person  All voicemails are confidential   Jayna Lynn all requests for admission clinical reviews, approved or denied determinations and any other requests to dedicated fax number below belonging to the campus where the patient is receiving treatment   List of dedicated fax numbers for the Facilities:  [address] DENIALS (Administrative/Medical Necessity) [phone]   [address] (Maternity/NICU/Pediatrics) 261 [facility] 40 125 [facility]  [phone]   Elan Barnhart 50 [address] [id number] [place] [address] [address] [place] [address] [address] [facility] [address] [facility] [phone]

## 2022-03-22 NOTE — CASE MANAGEMENT
Case Management Discharge Planning Note    Patient name Shelbie Martino  [facility] MRN [id number]  : 1950 Date 3/22/2022       Current Admission Date: 3/18/2022  Current Admission Diagnosis:Squamous cell carcinoma of vulva [facility])   Patient Active Problem List    Diagnosis Date Noted    [facility] 2022    Primary hypertension 2022    NADIYA (acute kidney injury) ([facility] ) 2022    Hypotension 2022    History of vulvectomy 2022    Squamous cell carcinoma of vulva ([facility] ) 2022      LOS (days): 4  Geometric Mean LOS (GMLOS) (days): 3 50  Days to GMLOS:-0 5     OBJECTIVE:  Risk of Unplanned Readmission Score: 8         Current admission status: Inpatient   Preferred Pharmacy:   [facility] [address] [place] [address] - [address] [facility]  [address] [facility] [address]  Phone: [phone] Fax: 113 [facility], [address] [facility] [address] [facility]  Phone: [phone] Fax: [phone]    Primary Care Provider: No primary care provider on file  Primary Insurance: [facility]  Secondary Insurance:     DISCHARGE DETAILS:    Discharge planning discussed with[de-identified] patient             Other Referral/Resources/Interventions Provided:  Interventions: Prescription Price Check  Referral Comments: patient will have new eliquis dose at time of discharge - called [facility] and performed price check  Eliquis cost is $9 85  Patient is okay with this  Did give patient an eliquis coupon as well to see if that can also be applied

## 2022-03-22 NOTE — ASSESSMENT & PLAN NOTE
· RRT early morning of 3/21 for  bpm, s/p adenosine revealing Afib  bpm   · Converted to NSR after 2 5 mg IV metoprolol  Remained asymptomatic  · Echo WNL  · Cardio appreciated  · D dimer elevated, however in setting of malignancy and now with new onset NADIYA   VQ scan low probability of PE  b/l LE duplex no DVT  · C/w metoprolol 25 mg bid  · Monitor on telemetry  · 2 5 mg IV lopressor prn

## 2022-03-22 NOTE — PROGRESS NOTES
Pt c/o increased lethargy and states " I just can't get warm " Dr Sherie Montes at bedside  Will continue to monitor

## 2022-03-23 ENCOUNTER — TELEPHONE (OUTPATIENT)
Dept: GYNECOLOGIC ONCOLOGY | Facility: CLINIC | Age: 72
End: 2022-03-23

## 2022-03-23 VITALS
SYSTOLIC BLOOD PRESSURE: 103 MMHG | TEMPERATURE: 98.3 F | WEIGHT: 214 LBS | HEIGHT: 65 IN | BODY MASS INDEX: 35.65 KG/M2 | DIASTOLIC BLOOD PRESSURE: 54 MMHG | RESPIRATION RATE: 16 BRPM | HEART RATE: 75 BPM | OXYGEN SATURATION: 94 %

## 2022-03-23 LAB
ALBUMIN SERPL BCP-MCNC: 3 G/DL (ref 3.5–5)
ALP SERPL-CCNC: 47 U/L (ref 46–116)
ALT SERPL W P-5'-P-CCNC: 19 U/L (ref 12–78)
ANION GAP SERPL CALCULATED.3IONS-SCNC: 5 MMOL/L (ref 4–13)
AST SERPL W P-5'-P-CCNC: 15 U/L (ref 5–45)
BILIRUB SERPL-MCNC: 0.32 MG/DL (ref 0.2–1)
BUN SERPL-MCNC: 10 MG/DL (ref 5–25)
CALCIUM ALBUM COR SERPL-MCNC: 9.7 MG/DL (ref 8.3–10.1)
CALCIUM SERPL-MCNC: 8.9 MG/DL (ref 8.3–10.1)
CHLORIDE SERPL-SCNC: 112 MMOL/L (ref 100–108)
CO2 SERPL-SCNC: 25 MMOL/L (ref 21–32)
CREAT SERPL-MCNC: 0.73 MG/DL (ref 0.6–1.3)
ERYTHROCYTE [DISTWIDTH] IN BLOOD BY AUTOMATED COUNT: 12.9 % (ref 11.6–15.1)
GFR SERPL CREATININE-BSD FRML MDRD: 83 ML/MIN/1.73SQ M
GLUCOSE SERPL-MCNC: 112 MG/DL (ref 65–140)
HCT VFR BLD AUTO: 32.9 % (ref 34.8–46.1)
HGB BLD-MCNC: 10.1 G/DL (ref 11.5–15.4)
MCH RBC QN AUTO: 31.2 PG (ref 26.8–34.3)
MCHC RBC AUTO-ENTMCNC: 30.7 G/DL (ref 31.4–37.4)
MCV RBC AUTO: 102 FL (ref 82–98)
PLATELET # BLD AUTO: 291 THOUSANDS/UL (ref 149–390)
PMV BLD AUTO: 10.5 FL (ref 8.9–12.7)
POTASSIUM SERPL-SCNC: 4 MMOL/L (ref 3.5–5.3)
PROT SERPL-MCNC: 6.3 G/DL (ref 6.4–8.2)
RBC # BLD AUTO: 3.24 MILLION/UL (ref 3.81–5.12)
SODIUM SERPL-SCNC: 142 MMOL/L (ref 136–145)
WBC # BLD AUTO: 8.4 THOUSAND/UL (ref 4.31–10.16)

## 2022-03-23 PROCEDURE — NC001 PR NO CHARGE: Performed by: OBSTETRICS & GYNECOLOGY

## 2022-03-23 PROCEDURE — 80053 COMPREHEN METABOLIC PANEL: CPT | Performed by: OBSTETRICS & GYNECOLOGY

## 2022-03-23 PROCEDURE — 99024 POSTOP FOLLOW-UP VISIT: CPT | Performed by: OBSTETRICS & GYNECOLOGY

## 2022-03-23 PROCEDURE — 85027 COMPLETE CBC AUTOMATED: CPT | Performed by: OBSTETRICS & GYNECOLOGY

## 2022-03-23 PROCEDURE — 99232 SBSQ HOSP IP/OBS MODERATE 35: CPT | Performed by: INTERNAL MEDICINE

## 2022-03-23 RX ADMIN — METOPROLOL TARTRATE 25 MG: 25 TABLET, FILM COATED ORAL at 10:06

## 2022-03-23 RX ADMIN — ACETAMINOPHEN 325MG 650 MG: 325 TABLET ORAL at 12:09

## 2022-03-23 RX ADMIN — ACETAMINOPHEN 325MG 650 MG: 325 TABLET ORAL at 05:06

## 2022-03-23 RX ADMIN — APIXABAN 5 MG: 5 TABLET, FILM COATED ORAL at 10:06

## 2022-03-23 RX ADMIN — DOCUSATE SODIUM 100 MG: 100 CAPSULE, LIQUID FILLED ORAL at 10:06

## 2022-03-23 RX ADMIN — OXYCODONE HYDROCHLORIDE 5 MG: 5 TABLET ORAL at 05:14

## 2022-03-23 NOTE — PROGRESS NOTES
For questions/concerns on this patient, please reach out to the following:  SLB-OB GYN-GynOnc- Resident/AP  Gyn Oncology Progress note   Hardik Ramos 70 y o  female MRN: [id number]  [facility] Encounter: [id number]    Assessment/Plan:    70 y o  with stage II vulvar cancer now POD#5 from radical vulvectomy and bilateral inguinal lymph node dissection  Now with new onset atrial fibrillation with rapid ventricular response requiring medical management  Hypotension  Assessment & Plan  Resolved s/p rate control of Afib w/ RVR  - Continue metoprolol 25 mg PO BID    NADIYA (acute kidney injury) ([facility] )  Assessment & Plan  Cr elevated to 1 5 on 3/21/22 from baseline of 0 8  In setting of hypotension, GI losses from multiple episodes of diarrhea 3/20/22, & N/V with poor PO intake post op  S/p IV hydration 3/21/22  Toradol discontinued  · Improving  · UA small leuk and negative nitrites  · Voiding w/ adequate UOP since Mesa removal  · Cr 1 50 --> 0 69  · Trend Cr on daily CMP    Primary hypertension  Assessment & Plan  Home meds include Lisinopril and HCTZ  · Hold lisinopril given NADIYA with plan to re-start as able  · Metoprolol 25 mg BID initiated in setting of new Afib w/ RVR  · Monitor BPs    Atrial fibrillation with RVR ([facility])  Assessment & Plan  Rapid response 3/21/22 early am for SVT, s/p adenosine revealing Afib  bpm   Converted to NSR after metoprolol 2 5 mg IV  Patient remained asymptomatic throughout  D-dimer elevated in s/o malignancy and new onset NADIYA    · S/p SLIM and cardiology consults, appreciate recs  · Metoprolol 25 mg BID with modified hold parameters given NADIYA  · Metoprolol 2 5 mg IV prn  · Echo WNL  · VQ scan to r/o PE, negative   · B/l LE duplex for b/l calf pain, negative  · Continue to monitor on telemetry, no events overnight  · Eliquis 5 mg BID    * Squamous cell carcinoma of vulva ([facility] )  Assessment & Plan  71 yo POD#4 from radical vulvectomy & b/l inguinal femoral LND for 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, likely now stage III based on suspicious lymph nodes noted intra-op  · Pain: Tylenol mahesh, jen & dilaudid prn  · TLD: s/p Mesa catheter, b/l KILLIAN drains, peripheral IV  · FEN: regular diet  · GI: Colace BID  · DVT ppx: SCDs, Eliquis 5 mg BID           Subjective:    Sasha Valdez has no current complaints  Pain is well controlled with oral pain medications  Patient is currently voiding  She is ambulating  Patient is currently passing flatus and has had bowel movement  She is tolerating PO, and denies nausea or vomitting  Patient denies fever, chills, chest pain, shortness of breath, or calf tenderness  She is excited to go home today  Objective:  BP 99/55   Pulse 83   Temp 98 4 °F (36 9 °C)   Resp 18   Ht 5' 5" (1 651 m)   Wt 97 1 kg (214 lb)   SpO2 96%   BMI 35 61 kg/m²     I/O last 3 completed shifts: In: 1100 [P O :100; IV Piggyback:1000]  Out: 5507 [Urine:2650; Drains:225]  I/O this shift:  In: -   Out: 1045 [Urine:900; Drains:145]    Lab Results   Component Value Date    WBC 7 11 03/22/2022    HGB 9 9 (L) 03/22/2022    HCT 31 6 (L) 03/22/2022    MCV 99 (H) 03/22/2022     03/22/2022       Lab Results   Component Value Date    CALCIUM 8 7 03/22/2022    K 4 2 03/22/2022    CO2 26 03/22/2022     (H) 03/22/2022    BUN 13 03/22/2022    CREATININE 0 69 03/22/2022           Physical Exam  Constitutional:       General: She is not in acute distress  Appearance: Normal appearance  She is not ill-appearing  HENT:      Mouth/Throat:      Mouth: Mucous membranes are moist    Eyes:      Extraocular Movements: Extraocular movements intact  Cardiovascular:      Rate and Rhythm: Normal rate and regular rhythm  Heart sounds: Normal heart sounds  Pulmonary:      Effort: Pulmonary effort is normal       Breath sounds: Normal breath sounds  Abdominal:      General: There is no distension  Palpations: Abdomen is soft  Tenderness: There is no abdominal tenderness  Musculoskeletal:         General: No swelling  Right lower leg: No edema  Left lower leg: No edema  Skin:     General: Skin is warm and dry  Capillary Refill: Capillary refill takes less than 2 seconds  Coloration: Skin is not jaundiced or pale  Neurological:      General: No focal deficit present  Mental Status: She is alert  Psychiatric:         Mood and Affect: Mood normal          Behavior: Behavior normal          Thought Content:  Thought content normal          Judgment: Judgment normal            Macie Danielle MD  3/23/2022  6:13 AM

## 2022-03-23 NOTE — DISCHARGE SUMMARY
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, likely now stage III based on suspicious lymph nodes noted intra-operatively though final pathology is pending  During her post-operative course, she developed SVT for which a rapid response was called, and after adenosine therapy to reduce heart rate, new onset atrial fibrillation with rapid ventricular response was identified  She was evaluated and followed by cardiology during the remainder of her hospitalization  Rate control was achieved by initiating metoprolol therapy, and she was started on therapeutic Eliquis prior to discharge  At this same time, she developed an NADIYA with creatinine rising to 1 5 from baseline of 0 8  NADIYA resolved with IV and oral hydration, and creatine returned to baseline  Hemoglobin remained stable post-operatively and after initiating Eliquis therapy  She was discharged home with bilateral inguinal femoral KILLIAN drains in place with and declined home nursing for drain management as she felt comfortable with drain care herself  Upon discharge, she was continued on Metoprolol and Eliquis as recommended by cardiology  She was discharged with follow up instructions with both gynecologic oncology as well as cardiology      Lab Results:   Lab Results   Component Value Date    WBC 8 40 03/23/2022    HGB 10 1 (L) 03/23/2022    HCT 32 9 (L) 03/23/2022     (H) 03/23/2022     03/23/2022     Lab Results   Component Value Date    CALCIUM 8 9 03/23/2022    K 4 0 03/23/2022    CO2 25 03/23/2022     (H) 03/23/2022    BUN 10 03/23/2022    CREATININE 0 73 03/23/2022     No results found for: POCGLU  No results found for: PTT  No results found for: INR, PROTIME    Complications: none apparent    Condition at discharge: stable     Discharge instructions/Information to patient and family:   See After Visit Summary for information provided to patient and family  Provisions for Follow-Up Care:  See After Visit Summary for information related to follow-up care and any pertinent home health orders  Disposition: See After Visit Summary for discharge disposition information  Planned Readmission: Yes, pending clinical status    Discharge Medications: For a complete list of the patient's medications, please refer to her med rec      Luc Dawson MD  3/23/2022  8:22 AM

## 2022-03-23 NOTE — CASE MANAGEMENT
Case Management Discharge Planning Note    Patient name Jazmyn Sierra  [facility] [id number]  : 1950 Date 3/23/2022       Current Admission Date: 3/18/2022  Current Admission Diagnosis:Squamous cell carcinoma of vulva [facility])   Patient Active Problem List    Diagnosis Date Noted    [facility] ) 2022    Primary hypertension 2022    NADIYA (acute kidney injury) ([facility] ) 2022    Hypotension 2022    History of vulvectomy 2022    Squamous cell carcinoma of vulva ([facility] ) 2022      LOS (days): 5  Geometric Mean LOS (GMLOS) (days): 3 50  Days to GMLOS:-1 2     OBJECTIVE:  Risk of Unplanned Readmission Score: 8         Current admission status: Inpatient   Preferred Pharmacy:   [facility] [address] [place] [address] - [address] [facility]  [address] [facility] [address]  Phone: [phone] Fax: 113 [facility], [address] [facility] [address] [facility]  Phone: [phone] Fax: [phone]    Primary Care Provider: No primary care provider on file  Primary Insurance: '[facility]  Secondary Insurance:     DISCHARGE DETAILS:    Discharge planning discussed with[de-identified] patient        CM contacted family/caregiver?: No- see comments (declined)                            Other Referral/Resources/Interventions Provided:  Referral Comments: patient medically cleared for d/c to home - no therapy needs at time of discharge  Spoke with the patient again regarding visiting nurses as the patient will be discharged with bilateral groin drains, vulvar wound and groin wounds as well  Patient continues to refuse visiting nurses at this time stating that she is comfortable and able to care for it herself  States she has supportive family who can also help    Notified Asa Hammans from OB/GYN to let them know of her refusal  Treatment Team Recommendation: Home with [facility]  Discharge Destination Plan[de-identified] Home                                IMM Given (Date):: 03/23/22  IMM Given to[de-identified] Patient

## 2022-03-23 NOTE — TELEPHONE ENCOUNTER
Prosper Nuñez from [facility] called regarding the patient coming in for Liquids rx and not being able to give it to the patient because the pharmacy received a cancellation for rx  Prosper Nuñez asked to be called back [phone]

## 2022-03-23 NOTE — UTILIZATION REVIEW
Notification of Discharge   This is a Notification of Discharge from our facility [address]  Please be advised that this patient has been discharge from our facility  Below you will find the admission and discharge date and time including the patients disposition  UTILIZATION REVIEW CONTACT:  Curvin Felt  Utilization   Network Utilization Review Department  Phone: [phone] x carefully listen to the prompts  All voicemails are confidential   Email: [email or web address]     PHYSICIAN ADVISORY SERVICES:  FOR HZIU-ND-AJHS REVIEW - MEDICAL NECESSITY DENIAL  Phone: [phone]  Fax: [phone]  Email: [email or web address]     PRESENTATION DATE: 3/18/2022  8:55 AM  OBERVATION ADMISSION DATE:   INPATIENT ADMISSION DATE: 3/18/22  4:37 PM   DISCHARGE DATE: 3/23/2022 12:17 PM  DISPOSITION: Home/Self Care Home/Self Care      IMPORTANT INFORMATION:  Send all requests for admission clinical reviews, approved or denied determinations and any other requests to dedicated fax number below belonging to the campus where the patient is receiving treatment   List of dedicated fax numbers:  [address] DENIALS (Administrative/Medical Necessity) [phone]   [address] (Maternity/NICU/Pediatrics) [phone]   Serjio Hernandez [phone]   130 [facility] [phone]   [address] [place] [address] [phone]   2000 [facility] [address] [place] [address],[address] [phone]   [facility]  [phone]   [address] [place] [address], [facility]  2401 [facility] [address] [facility] [phone]

## 2022-03-23 NOTE — PROGRESS NOTES
Cardiology Progress Note - Sloan Mirza 70 y o  female MRN: [id number]    [facility] Encounter: [id number]      Assessment & Plan:  Principal Problem:    Squamous cell carcinoma [facility] vulva ([facility] )  Active Problems:    Atrial fibrillation with RVR ([facility] )    Primary hypertension    NADIYA (acute kidney injury) ([facility] )    Hypotension       # New onset Atrial fibrillation with RVR  - Admitted with squamous cell carcinoma of the vulva s/p radical vulvectomy and LND  Went into A fib with RVR on POD 2    - Echocardiogram showed normal EF and no structural abnormality    - [id number]- VASc score- 3 points (age, sex and HTN)  - Started metoprolol tartrate 25 mg bid and eliquis 5 mg bid  - Currently normal sinus rhythm, rate at 70s     # Hypertension  - BP is stable  - She takes lisinopril 40 mg qd and HCTZ 12 5 mg qd at home      # Squamous cell carcinoma of vulva   - s/p radical vulvectomy and bilateral inguinal femoral LND POD 4     Plan:  - Continue low dose metoprolol tartrate 25 mg bid and Eliquis 5 mg bid   - Patient said she will follow up with [facility] cardiology  Subjective:   Patient seen and examined  No significant events overnight  Denies chest pain, pnd, orthopnea, abdominal pain, nausea vomiting, fever, chills, headache, dizziness or palpitations  Objective:     Vitals: Blood pressure 103/54, pulse 75, temperature 98 3 °F (36 8 °C), resp  rate 16, height 5' 5" (1 651 m), weight 97 1 kg (214 lb), SpO2 94 %  , Body mass index is 35 61 kg/m² ,   Orthostatic Blood Pressures      Most Recent Value   Blood Pressure 103/54 filed at 03/23/2022 4005   Patient Position - Orthostatic VS Lying filed at 03/21/2022 0815            Intake/Output Summary (Last 24 hours) at 3/23/2022 1241  Last data filed at 3/23/2022 8577  Gross per 24 hour   Intake 218 ml   Output 1395 ml   Net -1177 ml           Physical Exam:    GEN: Sloan Mirza appears well, alert and oriented x 3, pleasant and cooperative   HEENT: anicteric, mucous membranes moist  NECK: no jvd, carotid bruits   HEART: regular rhythm, normal S1 and S2, no murmurs, clicks, gallops or rubs   LUNGS: clear to auscultation bilaterally; no wheezes, rales, or rhonchi   ABDOMEN: normal bowel sounds, soft, no tenderness, no distention  EXTREMITIES: peripheral pulses normal; no clubbing, cyanosis, or edema  NEURO: no focal findings   SKIN: normal without suspicious lesions on exposed skin      Current Facility-Administered Medications:     acetaminophen (TYLENOL) tablet 650 mg, 650 mg, Oral, Q6H [facility], Silvia Cantu MD, 650 mg at 03/23/22 1209    apixaban (ELIQUIS) tablet 5 mg, 5 mg, Oral, BID, Lawyer Sandra AGUIAR MD, 5 mg at 03/23/22 1006    docusate sodium (COLACE) capsule 100 mg, 100 mg, Oral, BID, Silvia Cantu MD, 100 mg at 03/23/22 1006    HYDROmorphone (DILAUDID) injection 0 5 mg, 0 5 mg, Intravenous, Q4H PRN, Silvia Cantu MD, 0 5 mg at 03/20/22 0920    metoclopramide (REGLAN) injection 10 mg, 10 mg, Intravenous, Q6H PRN, Lawyer Sandra AGUIAR MD, 10 mg at 03/20/22 1802    metoprolol (LOPRESSOR) injection 5 mg, 5 mg, Intravenous, Q6H PRN, Cherry Irene MD    metoprolol tartrate (LOPRESSOR) tablet 25 mg, 25 mg, Oral, Q12H [facility], Cherry Irene MD, 25 mg at 03/23/22 1006    ondansetron (ZOFRAN) injection 4 mg, 4 mg, Intravenous, Q6H PRN, Silvia Cantu MD, 4 mg at 03/20/22 1623    oxyCODONE (ROXICODONE) IR tablet 2 5 mg, 2 5 mg, Oral, Q4H PRN **OR** oxyCODONE (ROXICODONE) IR tablet 5 mg, 5 mg, Oral, Q4H PRN, Silvia Cantu MD, 5 mg at 03/23/22 0514    simethicone (MYLICON) chewable tablet 80 mg, 80 mg, Oral, Q6H PRN, Maia Alston PA-C, 80 mg at 03/20/22 1802    Current Outpatient Medications:     ascorbic acid (VITAMIN C) 1000 MG tablet, Take 4 tablets by mouth daily, Disp: , Rfl:     estradiol (ESTRACE) 0 1 mg/g vaginal cream, Apply 0 5 gm cream vaginally every night at bedtime for two weeks   Then apply 0 5 gm cream vaginally at bedtime on Mondays and Thursdays  , Disp: , Rfl:     hydrochlorothiazide (MICROZIDE) 12 5 mg capsule, Take 1 capsule by mouth daily, Disp: , Rfl:     lisinopril (ZESTRIL) 40 mg tablet, Take 1 tablet by mouth daily, Disp: , Rfl:     acetaminophen (TYLENOL) 325 mg tablet, Take 2 tablets (650 mg total) by mouth every 6 (six) hours, Disp: , Rfl: 0    apixaban (Eliquis) 5 mg, Take 1 tablet (5 mg total) by mouth 2 (two) times a day, Disp: 60 tablet, Rfl: 10    meloxicam (MOBIC) 15 mg tablet, Take 15 mg by mouth if needed  , Disp: , Rfl:     metoprolol tartrate (LOPRESSOR) 25 mg tablet, Take 1 tablet (25 mg total) by mouth every 12 (twelve) hours, Disp: 60 tablet, Rfl: 1    oxyCODONE (ROXICODONE) 5 immediate release tablet, 1 -2 tabs by mouth every 4-6 hour as needed, Disp: 30 tablet, Rfl: 0    Potassium 99 MG TABS, Take 99 mg by mouth daily, Disp: , Rfl:     Labs & Results:    No results found for: CKTOTAL, CKMB, CKMBINDEX, TROPONINI    Lab Results   Component Value Date    CALCIUM 8 9 03/23/2022    K 4 0 03/23/2022    CO2 25 03/23/2022     (H) 03/23/2022    BUN 10 03/23/2022    CREATININE 0 73 03/23/2022       Lab Results   Component Value Date    WBC 8 40 03/23/2022    HGB 10 1 (L) 03/23/2022    HCT 32 9 (L) 03/23/2022     (H) 03/23/2022     03/23/2022           No results found for: CHOL  No results found for: HDL  No results found for: LDLCALC  No results found for: TRIG    Lab Results   Component Value Date    ALT 19 03/23/2022    AST 15 03/23/2022         EKG personally reviewed by )Franchesca Walton MD  No acute changes   TELE: No significant arrhythmias seen on telemetry review

## 2022-03-24 NOTE — TELEPHONE ENCOUNTER
LATE ENTRY: spoke with Symone on 3/24/22 2611  Explained patient filled medication at our hospital pharmacy  LMOM to inform patient, as family member was attempting to pick-up at [employer]

## 2022-03-27 ENCOUNTER — TELEPHONE (OUTPATIENT)
Dept: OTHER | Facility: OTHER | Age: 72
End: 2022-03-27

## 2022-03-27 NOTE — TELEPHONE ENCOUNTER
Paged on call provider  Patient has low blood pressure (78/55), and she is bleeding from the surgery site

## 2022-04-01 LAB
ATRIAL RATE: 170 BPM
QRS AXIS: -29 DEGREES
QRSD INTERVAL: 92 MS
QT INTERVAL: 302 MS
QTC INTERVAL: 482 MS
T WAVE AXIS: 82 DEGREES
VENTRICULAR RATE: 153 BPM

## 2022-04-01 PROCEDURE — 93010 ELECTROCARDIOGRAM REPORT: CPT | Performed by: INTERNAL MEDICINE

## 2022-04-06 ENCOUNTER — OFFICE VISIT (OUTPATIENT)
Dept: GYNECOLOGIC ONCOLOGY | Facility: CLINIC | Age: 72
End: 2022-04-06
Payer: COMMERCIAL

## 2022-04-06 VITALS
SYSTOLIC BLOOD PRESSURE: 124 MMHG | HEIGHT: 65 IN | DIASTOLIC BLOOD PRESSURE: 70 MMHG | TEMPERATURE: 96.8 F | HEART RATE: 66 BPM | BODY MASS INDEX: 35.99 KG/M2 | WEIGHT: 216 LBS | RESPIRATION RATE: 16 BRPM | OXYGEN SATURATION: 97 %

## 2022-04-06 DIAGNOSIS — C51.9 SQUAMOUS CELL CARCINOMA OF VULVA (HCC): ICD-10-CM

## 2022-04-06 DIAGNOSIS — R59.0 PELVIC LYMPHADENOPATHY: ICD-10-CM

## 2022-04-06 DIAGNOSIS — R60.0 BILATERAL LOWER EXTREMITY EDEMA: ICD-10-CM

## 2022-04-06 PROCEDURE — 99214 OFFICE O/P EST MOD 30 MIN: CPT | Performed by: OBSTETRICS & GYNECOLOGY

## 2022-04-06 NOTE — PROGRESS NOTES
Assessment/Plan:    Problem List Items Addressed This Visit        Genitourinary    [facility]     Patient is very pleasant 49-year-old female with a history of recently diagnosed stage III B vulvar cancer  She has done well postoperatively  Her complication of rapid AFib is controlled  She does have some lower extremity edema which is anticipated but cannot rule out DVT  Given her stage IIIB disease the patient is a candidate for radiation therapy to the inguinal area  This could also include the left pelvic area based on the potential of metastatic disease to the pelvic nodes which are slightly enlarged  I have ordered a PET-CT scan to further look at that area to see if there are any distant metastatic disease  The patient would like to see her providers in [facility] as there closer to her  A consultation to Radiation Oncology at through [facility] will be ordered  Additionally given the patient's lower extremity edema would like to rule out DVT  A compressive ultrasound bilateral Dopplers will be ordered  And finally for for management of lymphedema a physical therapy consult will be ordered  As the patient lives over 2 hours from the [facility] these were all be done through [facility]  Relevant Orders    NM PET CT skull base to mid thigh    Ambulatory referral to Radiation Oncology      Other Visit Diagnoses     Pelvic lymphadenopathy        Relevant Orders    NM PET CT skull base to mid thigh    Ambulatory referral to Radiation Oncology    Bilateral lower extremity edema        Relevant Orders    VAS lower limb venous duplex study, complete bilateral    Ambulatory referral to Physical Therapy            CHIEF COMPLAINT:  Treatment planning stage III B vulvar cancer      Problem:  Cancer Staging  Squamous cell carcinoma of vulva ([facility] )  Staging form: Vulva, AJCC 8th Edition  - Clinical stage from 2/23/2022:  FIGO Stage II (cT2, cN0, cM0) - Signed by Everardo Page MD on 2/23/2022  - Pathologic: FIGO Stage IIIB (pT2, pN2b, cM0) - Signed by Everardo Page MD on 4/6/2022        Previous therapy:  Oncology History   Squamous cell carcinoma of vulva ([facility] )   2/23/2022 Initial Diagnosis    Vulvar cancer ([facility] )     2/23/2022 -  Cancer Staged    Staging form: Vulva, AJCC 8th Edition  - Clinical stage from 2/23/2022: FIGO Stage II (cT2, cN0, cM0) - Signed by Everardo Page MD on 2/23/2022  Stage prefix: Initial diagnosis       4/2022 Surgery    Radical vulvectomy bilateral inguinal femoral lymphadenectomy  Bilateral inguinal nodes were noted to be positive 3 of 10 on the left and 1/5 on the right  There is no extracapsular extension  This is consistent with stage III B pathologic disease  There is also suggestion of pelvic node on CT scan     4/6/2022 -  Cancer Staged    Staging form: Vulva, AJCC 8th Edition  - Pathologic: FIGO Stage IIIB (pT2, pN2b, cM0) - Signed by Everardo Page MD on 4/6/2022  Femoral-inguinal uday status: Positive             Patient ID: Virginia Fuentes is a 70 y o  female  Patient is very pleasant 77-year-old female with a history of vulvar cancer status post radical vulvectomy bilateral inguinal femoral lymph node dissection  Patient's treatment course was complicated by atrial fibrillation with rapid ventricular response  Patient ruled out for MI and this was treated with beta blockers by Cardiology  Final pathology report reveals the following:  ABDOMEN/PELVIS:     LINES AND DEVICES: None  LIVER: Unremarkable  BILE DUCTS: Pneumobilia  GALLBLADDER: Surgically absent  PANCREAS: Unremarkable  SPLEEN: [place] Cue is a tiny hypodense lesion in the spleen which is too small to accurately characterize by CT  ADRENALS: Unremarkable     KIDNEYS/URETERS:  No hydronephrosis   Bilateral interpolar hypodense lesions are too small to characterize but most likely represent tiny cysts there is a tiny cortical calcification in the right mid kidney  Andrew Campanile BLADDER: Collapsed, limiting evaluation  BOWEL: Small hiatal hernia   Colonic diverticulosis  LYMPH NODES: Enlarged left inguinal lymph nodes concerning for metastatic disease   For reference, the largest measures 2 1 x 3 2 x 2 3 cm   Enlarged left external iliac lymph node measuring up to 1 3 cm in the short axis  VESSELS: Atherosclerotic changes of the aorta and its branches   No abdominal aortic aneurysm  REPRODUCTIVE ORGANS: Tubal occlusion devices  PERITONEUM/RETROPERITONEUM: Unremarkable  ABDOMINAL WALL/SOFT TISSUES: The left vulvar lesion is not visualized on this exam      BONES: Left total hip arthroplasty with streak artifact obscuring adjacent structures   Severe right osteoarthritis with acetabular protrusion   No destructive osseous lesions are identified  IMPRESSION   IMPRESSION   The left vulvar lesion is not visualized  Likely metastatic left inguinal and left external iliac lymphadenopathy  Preoperative CT scan of the chest abdomen pelvis did reveal slight enlargement and left external iliac adenopathy at 1 3 cm  Today, the patient is doing well  She denies significant abdominal pain, pelvic pain, nausea, vomiting, constipation, diarrhea, fevers, chills, or vaginal bleeding  She does note onset of lower extremity bilateral edema  She was started on a water pill by her family physician but this has not improved  The following portions of the patient's history were reviewed and updated as appropriate: allergies, current medications, past family history, past medical history, past surgical history and problem list     Review of Systems   Constitutional: Negative  Bilateral lower extremity edema   HENT: Negative  Eyes: Negative  Respiratory: Negative  Cardiovascular: Negative  Gastrointestinal: Negative  Endocrine: Negative  Genitourinary: Negative  Musculoskeletal: Negative  Skin: Negative  Neurological: Negative  Hematological: Negative  Psychiatric/Behavioral: Negative  Current Outpatient Medications   Medication Sig Dispense Refill    acetaminophen (TYLENOL) 325 mg tablet Take 2 tablets (650 mg total) by mouth every 6 (six) hours  0    apixaban (Eliquis) 5 mg Take 1 tablet (5 mg total) by mouth 2 (two) times a day 60 tablet 10    ascorbic acid (VITAMIN C) 1000 MG tablet Take 4 tablets by mouth daily      estradiol (ESTRACE) 0 1 mg/g vaginal cream Apply 0 5 gm cream vaginally every night at bedtime for two weeks  Then apply 0 5 gm cream vaginally at bedtime on Mondays and Thursdays   hydrochlorothiazide (MICROZIDE) 12 5 mg capsule Take 1 capsule by mouth daily      lisinopril (ZESTRIL) 40 mg tablet Take 1 tablet by mouth daily      meloxicam (MOBIC) 15 mg tablet Take 15 mg by mouth if needed        metoprolol tartrate (LOPRESSOR) 25 mg tablet Take 1 tablet (25 mg total) by mouth every 12 (twelve) hours 60 tablet 1    oxyCODONE (ROXICODONE) 5 immediate release tablet 1 -2 tabs by mouth every 4-6 hour as needed 30 tablet 0    Potassium 99 MG TABS Take 99 mg by mouth daily       No current facility-administered medications for this visit  Objective:    Blood pressure 124/70, pulse 66, temperature (!) 96 8 °F (36 °C), temperature source Temporal, resp  rate 16, height 5' 5" (1 651 m), weight 98 kg (216 lb), SpO2 97 %  Body mass index is 35 94 kg/m²  Body surface area is 2 04 meters squared  Physical Exam  Constitutional:       Appearance: She is well-developed  HENT:      Head: Normocephalic and atraumatic  Eyes:      Pupils: Pupils are equal, round, and reactive to light  Cardiovascular:      Rate and Rhythm: Normal rate and regular rhythm  Heart sounds: Normal heart sounds  Pulmonary:      Effort: Pulmonary effort is normal  No respiratory distress  Breath sounds: Normal breath sounds  Chest:   Breasts:      Right: No supraclavicular adenopathy        Left: No supraclavicular adenopathy  Abdominal:      General: Bowel sounds are normal  There is no distension  Palpations: Abdomen is soft  Abdomen is not rigid  Tenderness: There is no abdominal tenderness  There is no guarding or rebound  Genitourinary:     Comments: Bilateral inguinal sitting incisions intact  Drains draining serosanguineous drainage  The patient has not been measuring the fluid  Vulvar incisions are intact clean and dry  No evidence of superinfection  Musculoskeletal:         General: Normal range of motion  Cervical back: Normal range of motion and neck supple  Lymphadenopathy:      Cervical: No cervical adenopathy  Upper Body:      Right upper body: No supraclavicular adenopathy  Left upper body: No supraclavicular adenopathy  Skin:     General: Skin is warm and dry  Neurological:      Mental Status: She is alert and oriented to person, place, and time     Psychiatric:         Behavior: Behavior normal          No results found for:   Lab Results   Component Value Date    K 4 0 03/23/2022     (H) 03/23/2022    CO2 25 03/23/2022    BUN 10 03/23/2022    CREATININE 0 73 03/23/2022    CALCIUM 8 9 03/23/2022    CORRECTEDCA 9 7 03/23/2022    AST 15 03/23/2022    ALT 19 03/23/2022    ALKPHOS 47 03/23/2022    EGFR 83 03/23/2022     Lab Results   Component Value Date    WBC 8 40 03/23/2022    HGB 10 1 (L) 03/23/2022    HCT 32 9 (L) 03/23/2022     (H) 03/23/2022     03/23/2022     Lab Results   Component Value Date    NEUTROABS 9 27 (H) 03/21/2022        Trend:  No results found for:

## 2022-04-06 NOTE — LETTER
April 6, 2022     [address]    Patient: Nitesh Kraus   YOB: 1950   Date of Visit: 4/6/2022       Dear Dr Abigail Conner:    Thank you for referring Nitesh Kraus to me for evaluation  Below are my notes for this consultation  If you have questions, please do not hesitate to call me  I look forward to following your patient along with you  Sincerely,        Rodríguez Alonso MD        CC: No Recipients  Rodríguez Alonso MD  4/6/2022 11:57 AM  Incomplete  Assessment/Plan:    Problem List Items Addressed This Visit        Genitourinary    Squamous cell carcinoma of vulva ([facility] )     Patient is very pleasant 51-year-old female with a history of recently diagnosed stage III B vulvar cancer  She has done well postoperatively  Her complication of rapid AFib is controlled  She does have some lower extremity edema which is anticipated but cannot rule out DVT  Given her stage IIIB disease the patient is a candidate for radiation therapy to the inguinal area  This could also include the left pelvic area based on the potential of metastatic disease to the pelvic nodes which are slightly enlarged  I have ordered a PET-CT scan to further look at that area to see if there are any distant metastatic disease  The patient would like to see her providers in [facility] [place]  A consultation to Radiation Oncology at through [facility] will be ordered  Additionally given the patient's lower extremity edema would like to rule out DVT  A compressive ultrasound bilateral Dopplers will be ordered  And finally for for management of lymphedema a physical therapy consult will be ordered  As the patient lives over 2 hours from the [facility] these were all be done through [facility]           Relevant Orders    NM PET CT skull base to mid thigh    Ambulatory referral to Radiation Oncology      Other Visit Diagnoses     Pelvic lymphadenopathy Relevant Orders    NM PET CT skull base to mid thigh    Ambulatory referral to Radiation Oncology    Bilateral lower extremity edema        Relevant Orders    VAS lower limb venous duplex study, complete bilateral    Ambulatory referral to Physical Therapy            CHIEF COMPLAINT:  Treatment planning stage III B vulvar cancer      Problem:  Cancer Staging  Squamous cell carcinoma of vulva (HCC)  Staging form: Vulva, AJCC 8th Edition  - Clinical stage from 2/23/2022: FIGO Stage II (cT2, cN0, cM0) - Signed by Chery Koehler MD on 2/23/2022  - Pathologic: FIGO Stage IIIB (pT2, pN2b, cM0) - Signed by Chery Koehler MD on 4/6/2022        Previous therapy:  Oncology History   Squamous cell carcinoma of vulva ([facility] )   2/23/2022 Initial Diagnosis    [facility] )     2/23/2022 -  Cancer Staged    Staging form: Vulva, AJCC 8th Edition  - Clinical stage from 2/23/2022: FIGO Stage II (cT2, cN0, cM0) - Signed by Chery Koehler MD on 2/23/2022  Stage prefix: Initial diagnosis       4/2022 Surgery    Radical vulvectomy bilateral inguinal femoral lymphadenectomy  Bilateral inguinal nodes were noted to be positive 3 of 10 on the left and 1/5 on the right  There is no extracapsular extension  This is consistent with stage III B pathologic disease  There is also suggestion of pelvic node on CT scan     4/6/2022 -  Cancer Staged    Staging form: Vulva, AJCC 8th Edition  - Pathologic: FIGO Stage IIIB (pT2, pN2b, cM0) - Signed by Chery Koehler MD on 4/6/2022  Femoral-inguinal uday status: Positive             Patient ID: Farhan Messina is a 70 y o  female  Patient is very pleasant 35-year-old female with a history of vulvar cancer status post radical vulvectomy bilateral inguinal femoral lymph node dissection  Patient's treatment course was complicated by atrial fibrillation with rapid ventricular response  Patient ruled out for MI and this was treated with beta blockers by Cardiology      Final pathology report reveals the following:  ABDOMEN/PELVIS:     LINES AND DEVICES: None  LIVER: Unremarkable  BILE DUCTS: Pneumobilia  GALLBLADDER: Surgically absent  PANCREAS: Unremarkable  SPLEEN: Karine Sox is a tiny hypodense lesion in the spleen which is too small to accurately characterize by CT  ADRENALS: Unremarkable  KIDNEYS/URETERS:  No hydronephrosis   Bilateral interpolar hypodense lesions are too small to characterize but most likely represent tiny cysts there is a tiny cortical calcification in the right mid kidney  Gabriel Roulette BLADDER: Collapsed, limiting evaluation  BOWEL: Small hiatal hernia   Colonic diverticulosis  LYMPH NODES: Enlarged left inguinal lymph nodes concerning for metastatic disease   For reference, the largest measures 2 1 x 3 2 x 2 3 cm   Enlarged left external iliac lymph node measuring up to 1 3 cm in the short axis  VESSELS: Atherosclerotic changes of the aorta and its branches   No abdominal aortic aneurysm  REPRODUCTIVE ORGANS: Tubal occlusion devices  PERITONEUM/RETROPERITONEUM: Unremarkable  ABDOMINAL WALL/SOFT TISSUES: The left vulvar lesion is not visualized on this exam      BONES: Left total hip arthroplasty with streak artifact obscuring adjacent structures   Severe right osteoarthritis with acetabular protrusion   No destructive osseous lesions are identified  IMPRESSION   IMPRESSION   The left vulvar lesion is not visualized  Likely metastatic left inguinal and left external iliac lymphadenopathy  Preoperative CT scan of the chest abdomen pelvis did reveal slight enlargement and left external iliac adenopathy at 1 3 cm  Today, the patient is doing well  She denies significant abdominal pain, pelvic pain, nausea, vomiting, constipation, diarrhea, fevers, chills, or vaginal bleeding  She does note onset of lower extremity bilateral edema  She was started on a water pill by her family physician but this has not improved        The following portions of the patient's history were reviewed and updated as appropriate: allergies, current medications, past family history, past medical history, past surgical history and problem list     Review of Systems   Constitutional: Negative  Bilateral lower extremity edema   HENT: Negative  Eyes: Negative  Respiratory: Negative  Cardiovascular: Negative  Gastrointestinal: Negative  Endocrine: Negative  Genitourinary: Negative  Musculoskeletal: Negative  Skin: Negative  Neurological: Negative  Hematological: Negative  Psychiatric/Behavioral: Negative  Current Outpatient Medications   Medication Sig Dispense Refill    acetaminophen (TYLENOL) 325 mg tablet Take 2 tablets (650 mg total) by mouth every 6 (six) hours  0    apixaban (Eliquis) 5 mg Take 1 tablet (5 mg total) by mouth 2 (two) times a day 60 tablet 10    ascorbic acid (VITAMIN C) 1000 MG tablet Take 4 tablets by mouth daily      estradiol (ESTRACE) 0 1 mg/g vaginal cream Apply 0 5 gm cream vaginally every night at bedtime for two weeks  Then apply 0 5 gm cream vaginally at bedtime on Mondays and Thursdays   hydrochlorothiazide (MICROZIDE) 12 5 mg capsule Take 1 capsule by mouth daily      lisinopril (ZESTRIL) 40 mg tablet Take 1 tablet by mouth daily      meloxicam (MOBIC) 15 mg tablet Take 15 mg by mouth if needed        metoprolol tartrate (LOPRESSOR) 25 mg tablet Take 1 tablet (25 mg total) by mouth every 12 (twelve) hours 60 tablet 1    oxyCODONE (ROXICODONE) 5 immediate release tablet 1 -2 tabs by mouth every 4-6 hour as needed 30 tablet 0    Potassium 99 MG TABS Take 99 mg by mouth daily       No current facility-administered medications for this visit  Objective:    Blood pressure 124/70, pulse 66, temperature (!) 96 8 °F (36 °C), temperature source Temporal, resp  rate 16, height 5' 5" (1 651 m), weight 98 kg (216 lb), SpO2 97 %  Body mass index is 35 94 kg/m²    Body surface area is 2 04 meters squared  Physical Exam  Constitutional:       Appearance: She is well-developed  HENT:      Head: Normocephalic and atraumatic  Eyes:      Pupils: Pupils are equal, round, and reactive to light  Cardiovascular:      Rate and Rhythm: Normal rate and regular rhythm  Heart sounds: Normal heart sounds  Pulmonary:      Effort: Pulmonary effort is normal  No respiratory distress  Breath sounds: Normal breath sounds  Chest:   Breasts:      Right: No supraclavicular adenopathy  Left: No supraclavicular adenopathy  Abdominal:      General: Bowel sounds are normal  There is no distension  Palpations: Abdomen is soft  Abdomen is not rigid  Tenderness: There is no abdominal tenderness  There is no guarding or rebound  Genitourinary:     Comments: Bilateral inguinal sitting incisions intact  Drains draining serosanguineous drainage  The patient has not been measuring the fluid  Vulvar incisions are intact clean and dry  No evidence of superinfection  Musculoskeletal:         General: Normal range of motion  Cervical back: Normal range of motion and neck supple  Lymphadenopathy:      Cervical: No cervical adenopathy  Upper Body:      Right upper body: No supraclavicular adenopathy  Left upper body: No supraclavicular adenopathy  Skin:     General: Skin is warm and dry  Neurological:      Mental Status: She is alert and oriented to person, place, and time     Psychiatric:         Behavior: Behavior normal          No results found for:   Lab Results   Component Value Date    K 4 0 03/23/2022     (H) 03/23/2022    CO2 25 03/23/2022    BUN 10 03/23/2022    CREATININE 0 73 03/23/2022    CALCIUM 8 9 03/23/2022    CORRECTEDCA 9 7 03/23/2022    AST 15 03/23/2022    ALT 19 03/23/2022    ALKPHOS 47 03/23/2022    EGFR 83 03/23/2022     Lab Results   Component Value Date    WBC 8 40 03/23/2022    HGB 10 1 (L) 03/23/2022    HCT 32 9 (L) 03/23/2022     (H) 03/23/2022     03/23/2022     Lab Results   Component Value Date    NEUTROABS 9 27 (H) 03/21/2022        Trend:  No results found for:

## 2022-04-06 NOTE — ASSESSMENT & PLAN NOTE
Patient is very pleasant 58-year-old female with a history of recently diagnosed stage III B vulvar cancer  She has done well postoperatively  Her complication of rapid AFib is controlled  She does have some lower extremity edema which is anticipated but cannot rule out DVT  Given her stage IIIB disease the patient is a candidate for radiation therapy to the inguinal area  This could also include the left pelvic area based on the potential of metastatic disease to the pelvic nodes which are slightly enlarged  I have ordered a PET-CT scan to further look at that area to see if there are any distant metastatic disease  The patient would like to see her providers in [facility] as there closer to her  A consultation to Radiation Oncology at through [facility] will be ordered  Additionally given the patient's lower extremity edema would like to rule out DVT  A compressive ultrasound bilateral Dopplers will be ordered  And finally for for management of lymphedema a physical therapy consult will be ordered  As the patient lives over 2 hours from the [facility]'s site these were all be done through [facility]

## 2022-04-11 ENCOUNTER — DOCUMENTATION (OUTPATIENT)
Dept: GYNECOLOGIC ONCOLOGY | Facility: CLINIC | Age: 72
End: 2022-04-11

## 2022-04-11 NOTE — PROGRESS NOTES
Multidisciplinary Gynecologic Oncology Tumor Case Review       Physician Recommended Plan     Adonay Scott is a 70 y o  female     Diagnosis: IIIB squamous cell carcinoma of the vulva     Patient was discussed at the Multidisciplinary Gynecologic Oncology Case review on 4/11/2022  The group recommended to consider treatment with EBRT plus or minus systemic chemotherapy with Cisplatin  PET CT has been scheduled  Follow-up appointment with Dr Ching Hamilton on 4/20/2022  NCCN guidelines were available for review  The final treatment plan will be left to the discretion of the patient and the treating physician  DISCLAIMERS:    TO THE TREATING PHYSICIAN:  This conference is a meeting of clinicians from various specialty areas who evaluate and discuss patients for whom a multidisciplinary treatment approach is being considered  Please note that the above opinion was a consensus of the conference attendees and is intended only to assist in quality care of the discussed patient  The responsibility for follow up on the input given during the conference, along with any final decisions regarding plan of care, is that of the patient and the patient's provider  Accordingly, appointments have only been recommended based on this information and have NOT been scheduled unless otherwise noted  TO THE PATIENT:  This summary is a brief record of major aspects of your cancer treatment  You may choose to share a copy with any of your doctors or nurses  However, this is not a detailed or comprehensive record of your care

## 2022-04-26 ENCOUNTER — OFFICE VISIT (OUTPATIENT)
Dept: GYNECOLOGIC ONCOLOGY | Facility: CLINIC | Age: 72
End: 2022-04-26

## 2022-04-26 VITALS
TEMPERATURE: 98.6 F | HEART RATE: 94 BPM | WEIGHT: 212 LBS | BODY MASS INDEX: 35.32 KG/M2 | DIASTOLIC BLOOD PRESSURE: 88 MMHG | SYSTOLIC BLOOD PRESSURE: 126 MMHG | OXYGEN SATURATION: 96 % | HEIGHT: 65 IN | RESPIRATION RATE: 16 BRPM

## 2022-04-26 DIAGNOSIS — C51.9 SQUAMOUS CELL CARCINOMA OF VULVA (HCC): Primary | ICD-10-CM

## 2022-04-26 PROCEDURE — 99024 POSTOP FOLLOW-UP VISIT: CPT | Performed by: OBSTETRICS & GYNECOLOGY

## 2022-04-26 NOTE — ASSESSMENT & PLAN NOTE
Patient is very pleasant 63-year-old female with history of stage IIIB squamous cell carcinoma of the vulva  Her wounds are healing well  Her drains and staples were removed  She has an appointment for radiation therapy up at [facility] later on this week  The patient's pain is still fairly high but she is not desirous of any further opioid treatments  She will continue with Tylenol  We have told the patient will see her back in 3 months for repeat evaluation as she comes over 2 hours to these trips  If the patient develops bacterial superinfection we have asked her to send us a picture so we could go ahead and treat with virtual visit rather than having her travel all the way here  The patient develops any issue she will call

## 2022-04-26 NOTE — PROGRESS NOTES
Assessment/Plan:    Problem List Items Addressed This Visit        Genitourinary    Squamous cell carcinoma of vulva ([facility] ) - Primary     Patient is very pleasant 28-year-old female with history of stage IIIB squamous cell carcinoma of the vulva  Her wounds are healing well  Her drains and staples were removed  She has an appointment for radiation therapy up at [facility] later on this week  The patient's pain is still fairly high but she is not desirous of any further opioid treatments  She will continue with Tylenol  We have told the patient will see her back in 3 months for repeat evaluation as she comes over 2 hours to these trips  If the patient develops bacterial superinfection we have asked her to send us a picture so we could go ahead and treat with virtual visit rather than having her travel all the way here  The patient develops any issue she will call  CHIEF COMPLAINT:  Follow-up vulvar carcinoma stage IIIB      Problem:  Cancer Staging  Squamous cell carcinoma of vulva ([facility] )  Staging form: Vulva, AJCC 8th Edition  - Clinical stage from 2/23/2022: FIGO Stage II (cT2, cN0, cM0) - Signed by Krishna Julian MD on 2/23/2022  - Pathologic: FIGO Stage IIIB (pT2, pN2b, cM0) - Signed by Krishna Julian MD on 4/6/2022        Previous therapy:  Oncology History   Squamous cell carcinoma of vulva ([facility] )   2/23/2022 Initial Diagnosis    Vulvar cancer ([place] [address] )     2/23/2022 -  Cancer Staged    Staging form: Vulva, AJCC 8th Edition  - Clinical stage from 2/23/2022: FIGO Stage II (cT2, cN0, cM0) - Signed by Krishna Julian MD on 2/23/2022  Stage prefix: Initial diagnosis       4/2022 Surgery    Radical vulvectomy bilateral inguinal femoral lymphadenectomy  Bilateral inguinal nodes were noted to be positive 3 of 10 on the left and 1/5 on the right  There is no extracapsular extension  This is consistent with stage III B pathologic disease    There is also suggestion of pelvic node on CT scan     4/6/2022 -  Cancer Staged    Staging form: Vulva, AJCC 8th Edition  - Pathologic: FIGO Stage IIIB (pT2, pN2b, cM0) - Signed by Adele Glez MD on 4/6/2022  Femoral-inguinal uday status: Positive             Patient ID: Adriana Mckenzie is a 70 y o  female  Patient is very pleasant 31-year-old female with history of stage IIIB squamous cell carcinoma of the vulva  She has recently undergone radical vulvectomy and bilateral inguinal femoral lymphadenectomy  Final pathology report reveals bilateral inguinal lymphadenopathy metastasis  A pelvic node was suspicious and a PET-CT scan was ordered  This reveals the following:  Cutaneous: None     IMPRESSION   Linear intense FDG uptake in the posterior aspect of   vulva concerning for residual disease  However given her surgical   history of radical vulvectomy, a MRI of the soft tissues of the   pelvis is suggested for further evaluation  Postsurgical changes of lymphadenectomy are seen in both inguinal   regions with no residual uday metastatic disease either in the   inguinal regions or pelvis  No PET evidence of distant metastatic disease  A compressive ultrasound and Doppler was performed of the bilateral lower extremity and was negative for DVT  In the interim the patient reports that her drains have been putting up between 20 and 30 cc per day however they have been leaking  The following portions of the patient's history were reviewed and updated as appropriate: allergies, current medications, past medical history, past social history, past surgical history and problem list     Review of Systems   Constitutional: Negative  HENT: Negative  Eyes: Negative  Respiratory: Negative  Cardiovascular: Negative  Gastrointestinal: Negative  Endocrine: Negative  Genitourinary: Positive for vaginal pain  Musculoskeletal: Negative  Skin: Negative  Neurological: Negative  Hematological: Negative  Psychiatric/Behavioral: Negative  Current Outpatient Medications   Medication Sig Dispense Refill    acetaminophen (TYLENOL) 325 mg tablet Take 2 tablets (650 mg total) by mouth every 6 (six) hours  0    ascorbic acid (VITAMIN C) 1000 MG tablet Take 4 tablets by mouth daily      estradiol (ESTRACE) 0 1 mg/g vaginal cream Apply 0 5 gm cream vaginally every night at bedtime for two weeks  Then apply 0 5 gm cream vaginally at bedtime on Mondays and Thursdays   hydrochlorothiazide (MICROZIDE) 12 5 mg capsule Take 1 capsule by mouth daily      lisinopril (ZESTRIL) 40 mg tablet Take 20 mg by mouth daily        meloxicam (MOBIC) 15 mg tablet Take 15 mg by mouth if needed        metoprolol tartrate (LOPRESSOR) 25 mg tablet Take 1 tablet (25 mg total) by mouth every 12 (twelve) hours 60 tablet 1    oxyCODONE (ROXICODONE) 5 immediate release tablet 1 -2 tabs by mouth every 4-6 hour as needed 30 tablet 0    Potassium 99 MG TABS Take 99 mg by mouth daily      apixaban (Eliquis) 5 mg Take 1 tablet (5 mg total) by mouth 2 (two) times a day 60 tablet 10     No current facility-administered medications for this visit  Objective:    Blood pressure 126/88, pulse 94, temperature 98 6 °F (37 °C), temperature source Temporal, resp  rate 16, height 5' 5" (1 651 m), weight 96 2 kg (212 lb), SpO2 96 %  Body mass index is 35 28 kg/m²  Body surface area is 2 03 meters squared  Physical Exam  Constitutional:       Appearance: She is well-developed  HENT:      Head: Normocephalic and atraumatic  Eyes:      Pupils: Pupils are equal, round, and reactive to light  Cardiovascular:      Rate and Rhythm: Normal rate and regular rhythm  Heart sounds: Normal heart sounds  Pulmonary:      Effort: Pulmonary effort is normal  No respiratory distress  Breath sounds: Normal breath sounds  Chest:   Breasts:      Right: No supraclavicular adenopathy  Left: No supraclavicular adenopathy  Abdominal:      General: Bowel sounds are normal  There is no distension  Palpations: Abdomen is soft  Abdomen is not rigid  Tenderness: There is no abdominal tenderness  There is no guarding or rebound  Genitourinary:     Comments: Patient is status post vulvectomy  No evidence of bacterial superinfection are noted in the primary vulvar incision  The bilateral inguinal femoral incisions are intact  Both drains are somewhat protruding  Drains were removed and staples removed and Steri-Strips placed  Musculoskeletal:         General: Normal range of motion  Cervical back: Normal range of motion and neck supple  Lymphadenopathy:      Cervical: No cervical adenopathy  Upper Body:      Right upper body: No supraclavicular adenopathy  Left upper body: No supraclavicular adenopathy  Skin:     General: Skin is warm and dry  Neurological:      Mental Status: She is alert and oriented to person, place, and time     Psychiatric:         Behavior: Behavior normal          No results found for:   Lab Results   Component Value Date    K 4 0 03/23/2022     (H) 03/23/2022    CO2 25 03/23/2022    BUN 10 03/23/2022    CREATININE 0 73 03/23/2022    CALCIUM 8 9 03/23/2022    CORRECTEDCA 9 7 03/23/2022    AST 15 03/23/2022    ALT 19 03/23/2022    ALKPHOS 47 03/23/2022    EGFR 83 03/23/2022     Lab Results   Component Value Date    WBC 8 40 03/23/2022    HGB 10 1 (L) 03/23/2022    HCT 32 9 (L) 03/23/2022     (H) 03/23/2022     03/23/2022     Lab Results   Component Value Date    NEUTROABS 9 27 (H) 03/21/2022        Trend:  No results found for:

## 2022-05-04 ENCOUNTER — TELEPHONE (OUTPATIENT)
Dept: GYNECOLOGIC ONCOLOGY | Facility: CLINIC | Age: 72
End: 2022-05-04

## 2022-05-04 NOTE — TELEPHONE ENCOUNTER
Cally called in from [facility] regarding the referral sent to Dr Sanjiv Wadsworth from Dr Sunil Chacon- he would like to speak with Dr Sunil Chacon regarding patient's case       Phone number to office- [phone]

## (undated) DEVICE — ELECTRODE NEEDLE E-Z CLEAN 2.75IN 7CM -0013

## (undated) DEVICE — PENCIL ELECTROSURG E-Z CLEAN -0035H

## (undated) DEVICE — SUT MONOCRYL 4-0 PS-2 18 IN Y496G

## (undated) DEVICE — GLOVE INDICATOR PI UNDERGLOVE SZ 7.5 BLUE

## (undated) DEVICE — LIGACLIP MCA MULTIPLE CLIP APPLIERS, 20 MEDIUM CLIPS: Brand: LIGACLIP

## (undated) DEVICE — SUT VICRYL 0 CT-1 27 IN J260H

## (undated) DEVICE — INVIEW CLEAR LEGGINGS: Brand: CONVERTORS

## (undated) DEVICE — SUT MONOCRYL 3-0 SH 27 IN Y316H

## (undated) DEVICE — CHLORHEXIDINE 4PCT 4 OZ

## (undated) DEVICE — PROXIMATE SKIN STAPLERS (35 WIDE) CONTAINS 35 STAINLESS STEEL STAPLES (FIXED HEAD): Brand: PROXIMATE

## (undated) DEVICE — LIGHT GLOVE GREEN

## (undated) DEVICE — GAUZE SPONGES,16 PLY: Brand: CURITY

## (undated) DEVICE — TELFA NON-ADHERENT ABSORBENT DRESSING: Brand: TELFA

## (undated) DEVICE — GLOVE PI ULTRA TOUCH SZ.7.5

## (undated) DEVICE — ICE PACK EYE

## (undated) DEVICE — RAZOR STERILE

## (undated) DEVICE — INTENDED FOR TISSUE SEPARATION, AND OTHER PROCEDURES THAT REQUIRE A SHARP SURGICAL BLADE TO PUNCTURE OR CUT.: Brand: BARD-PARKER SAFETY BLADES SIZE 10, STERILE

## (undated) DEVICE — JP PERF DRN SIL FLT 10MM FULL: Brand: CARDINAL HEALTH

## (undated) DEVICE — PREMIUM DRY TRAY LF: Brand: MEDLINE INDUSTRIES, INC.

## (undated) DEVICE — PACK PBDS MAJOR GYN VAGINAL SLB

## (undated) DEVICE — TIBURON TRANSVERSE LAPAROTOMY SHEET: Brand: CONVERTORS

## (undated) DEVICE — ANTIBACTERIAL UNDYED BRAIDED (POLYGLACTIN 910), SYNTHETIC ABSORBABLE SUTURE: Brand: COATED VICRYL

## (undated) DEVICE — CHLORAPREP HI-LITE 10.5ML ORANGE

## (undated) DEVICE — ADHESIVE SKIN HIGH VISCOSITY EXOFIN 1ML

## (undated) DEVICE — SYRINGE BULB 2 OZ

## (undated) DEVICE — SUT VICRYL 3-0 SH 27 IN J416H

## (undated) DEVICE — PVC URETHRAL CATHETER: Brand: DOVER

## (undated) DEVICE — SUT ETHILON 3-0 FS-1 18 IN 663G

## (undated) DEVICE — BULB SYRINGE,IRRIGATION WITH PROTECTIVE CAP: Brand: DOVER

## (undated) DEVICE — TRAY FOLEY 16FR URIMETER SILICONE SURESTEP

## (undated) DEVICE — SUT VICRYL 0 REEL 54 IN J287G

## (undated) DEVICE — JACKSON-PRATT 100CC BULB RESERVOIR: Brand: CARDINAL HEALTH

## (undated) DEVICE — INTENDED FOR TISSUE SEPARATION, AND OTHER PROCEDURES THAT REQUIRE A SHARP SURGICAL BLADE TO PUNCTURE OR CUT.: Brand: BARD-PARKER SAFETY BLADES SIZE 15, STERILE

## (undated) DEVICE — DRAPE TOWEL: Brand: CONVERTORS